# Patient Record
Sex: MALE | Race: WHITE | NOT HISPANIC OR LATINO | Employment: OTHER | URBAN - METROPOLITAN AREA
[De-identification: names, ages, dates, MRNs, and addresses within clinical notes are randomized per-mention and may not be internally consistent; named-entity substitution may affect disease eponyms.]

---

## 2017-03-09 ENCOUNTER — TELEPHONE (OUTPATIENT)
Dept: UROLOGY | Facility: CLINIC | Age: 72
End: 2017-03-09

## 2017-03-09 NOTE — TELEPHONE ENCOUNTER
----- Message from Kamini Corea sent at 3/9/2017 10:20 AM CST -----  Contact: Patient Himself  X  1st Request  _  2nd Request  _  3rd Request    Who: Janak Martin (mrn# [id number])    Why: Patient called requesting the the orders are put in for his PSA and then faxed over to [facility] prior to his appointment Tuesday 05/02/2017. Please do so at your earliest convenience.  THANKS!     What Number to Call Back: [phone]    When to Expect a call back: (Before the end of the day)   -- if the call is after 12:00, the call back will be tomorrow.

## 2017-05-02 ENCOUNTER — OFFICE VISIT (OUTPATIENT)
Dept: UROLOGY | Facility: CLINIC | Age: 72
End: 2017-05-02
Attending: UROLOGY
Payer: MEDICARE

## 2017-05-02 VITALS
BODY MASS INDEX: 29.31 KG/M2 | HEART RATE: 92 BPM | HEIGHT: 73 IN | WEIGHT: 221.13 LBS | DIASTOLIC BLOOD PRESSURE: 73 MMHG | SYSTOLIC BLOOD PRESSURE: 126 MMHG

## 2017-05-02 DIAGNOSIS — R35.0 URINARY FREQUENCY: ICD-10-CM

## 2017-05-02 DIAGNOSIS — C61 PROSTATE CANCER: ICD-10-CM

## 2017-05-02 DIAGNOSIS — N52.9 ERECTILE DYSFUNCTION, UNSPECIFIED ERECTILE DYSFUNCTION TYPE: Primary | ICD-10-CM

## 2017-05-02 PROCEDURE — 99213 OFFICE O/P EST LOW 20 MIN: CPT | Mod: S$PBB,,, | Performed by: NURSE PRACTITIONER

## 2017-05-02 PROCEDURE — 99999 PR PBB SHADOW E&M-EST. PATIENT-LVL III: CPT | Mod: PBBFAC,,, | Performed by: UROLOGY

## 2017-05-02 PROCEDURE — 99213 OFFICE O/P EST LOW 20 MIN: CPT | Mod: PBBFAC | Performed by: UROLOGY

## 2017-05-02 RX ORDER — SILDENAFIL CITRATE 20 MG/1
20 TABLET ORAL DAILY PRN
Qty: 60 TABLET | Refills: 11 | Status: SHIPPED | OUTPATIENT
Start: 2017-05-02 | End: 2018-05-10 | Stop reason: SDUPTHER

## 2017-05-02 RX ORDER — OXYBUTYNIN CHLORIDE 5 MG/1
TABLET, EXTENDED RELEASE ORAL
Refills: 3 | COMMUNITY
Start: 2017-04-18 | End: 2017-10-21 | Stop reason: SDUPTHER

## 2017-05-02 RX ORDER — ASPIRIN 81 MG/1
81 TABLET ORAL DAILY
COMMUNITY

## 2017-05-02 NOTE — PROGRESS NOTES
"Subjective:      Janak Martin is a 71 y.o. male who returns today regarding his prostate cancer. He is an established patient of Dr. Miranda and is new to me today.     He is now over 3 years s/p brachytherapy. He denies any bone/joint pain or weight loss.   Ditropan XL 10 mg controls his urinary frequency well. He does consume caffeine and notices an improvement when he decreases his intake.   He continues to take flomax daily, and he reports a good urinary stream.  His radiation proctitis has improved significantly.  He has been seen Dr. Rey Elliott for this.      The following portions of the patient's history were reviewed and updated as appropriate: allergies, current medications, past family history, past medical history, past social history, past surgical history and problem list.    Review of Systems  Pertinent items are noted in HPI.  A comprehensive multipoint review of systems was negative except as otherwise stated in the HPI.     Objective:   Vitals:   /73 (BP Location: Left arm, Patient Position: Sitting, BP Method: Automatic)  Pulse 92  Ht 6' 1" (1.854 m)  Wt 100.3 kg (221 lb 1.9 oz)  BMI 29.17 kg/m2    Physical Exam   General: alert and oriented, no acute distress  Respiratory: Symmetric expansion, non-labored breathing  Cardiovascular: no peripheral edema  Abdomen:  non distended  Skin: normal coloration and turgor, no rashes, no suspicious skin lesions noted  Neuro: no gross deficits  Psych: normal judgment and insight, normal mood/affect and non-anxious    Physical Exam    Lab Review   Urinalysis demonstrates: no sample today   Lab Results   Component Value Date    WBC 9.37 08/07/2015    HGB 15.6 08/07/2015    HCT 45.8 08/07/2015    MCV 91 08/07/2015     08/07/2015     Lab Results   Component Value Date    CREATININE 1.3 08/07/2015    BUN 25 (H) 08/07/2015     PSA less than 0.1 ([facility])      Imaging    None    Assessment and Plan:   Janak was seen today for establish " care.    Diagnoses and all orders for this visit:    Erectile dysfunction, unspecified erectile dysfunction type  -     sildenafil (REVATIO) 20 mg Tab; Take 1 tablet (20 mg total) by mouth daily as needed. Take 1-5 tablets for erections. Take on an empty stomach.    Prostate cancer GENET  -     Prostate Specific Antigen, Diagnostic; Future  -     Prostate Specific Antigen, Diagnostic    Urinary frequency      Plan:   --PSA remains undetectable  --Trial of revatio for ED  --Continue ditropan and flomax   --Follow up in 6 months with PSA   --Follow-up with Dr. Guerra for radiation proctitis.

## 2017-10-21 RX ORDER — OXYBUTYNIN CHLORIDE 5 MG/1
TABLET, EXTENDED RELEASE ORAL
Qty: 90 TABLET | Refills: 3 | Status: SHIPPED | OUTPATIENT
Start: 2017-10-21 | End: 2018-06-08 | Stop reason: SDUPTHER

## 2017-10-23 DIAGNOSIS — R35.0 FREQUENCY OF URINATION: ICD-10-CM

## 2017-10-23 DIAGNOSIS — R39.9 LOWER URINARY TRACT SYMPTOMS: Primary | ICD-10-CM

## 2017-10-23 RX ORDER — TAMSULOSIN HYDROCHLORIDE 0.4 MG/1
0.8 CAPSULE ORAL DAILY
Qty: 60 CAPSULE | Refills: 11 | Status: SHIPPED | OUTPATIENT
Start: 2017-10-23 | End: 2018-06-08 | Stop reason: SDUPTHER

## 2017-11-03 ENCOUNTER — TELEPHONE (OUTPATIENT)
Dept: UROLOGY | Facility: CLINIC | Age: 72
End: 2017-11-03

## 2017-11-03 NOTE — TELEPHONE ENCOUNTER
----- Message from Rand Perry sent at 11/3/2017 10:23 AM CDT -----  Contact: SASHA CAMARENA [[id number]]  Patient would like PSA labs sent to [facility] in [place], [place]. Please call     Call back [phone]

## 2017-12-08 ENCOUNTER — OFFICE VISIT (OUTPATIENT)
Dept: UROLOGY | Facility: CLINIC | Age: 72
End: 2017-12-08
Attending: UROLOGY
Payer: MEDICARE

## 2017-12-08 VITALS
DIASTOLIC BLOOD PRESSURE: 83 MMHG | BODY MASS INDEX: 29.31 KG/M2 | WEIGHT: 221.13 LBS | SYSTOLIC BLOOD PRESSURE: 134 MMHG | HEART RATE: 97 BPM | HEIGHT: 73 IN

## 2017-12-08 DIAGNOSIS — C61 PROSTATE CANCER: Primary | ICD-10-CM

## 2017-12-08 DIAGNOSIS — N52.39 OTHER POST-PROCEDURAL ERECTILE DYSFUNCTION: ICD-10-CM

## 2017-12-08 PROCEDURE — 99213 OFFICE O/P EST LOW 20 MIN: CPT | Mod: S$GLB,,, | Performed by: NURSE PRACTITIONER

## 2017-12-08 RX ORDER — TRAMADOL HYDROCHLORIDE 50 MG/1
TABLET ORAL
Refills: 0 | COMMUNITY
Start: 2017-10-20 | End: 2018-08-27

## 2017-12-08 NOTE — PROGRESS NOTES
"Subjective:      Janak Martin is a 72 y.o. male who returns today regarding his prostate cancer.      He is now over 4 years s/p brachytherapy for his prostate cancer. He denies any bone/joint pain or weight loss. PSA remains <0.1.     Ditropan XL 10 mg controls his urinary frequency well. He continues to take flomax daily, and he reports a good urinary stream. No urinary symptoms today. Revatio is working well for his ED.     The following portions of the patient's history were reviewed and updated as appropriate: allergies, current medications, past family history, past medical history, past social history, past surgical history and problem list.    Review of Systems  Pertinent items are noted in HPI.  A comprehensive multipoint review of systems was negative except as otherwise stated in the HPI.     Objective:   Vitals:   /83 (BP Location: Left arm, Patient Position: Sitting, BP Method: Large (Automatic))   Pulse 97   Ht 6' 1" (1.854 m)   Wt 100.3 kg (221 lb 1.9 oz)   BMI 29.17 kg/m²     Physical Exam   General: alert and oriented, no acute distress  Respiratory: Symmetric expansion, non-labored breathing  Cardiovascular: no peripheral edema  Abdomen:  non distended  Skin: normal coloration and turgor, no rashes, no suspicious skin lesions noted  Neuro: no gross deficits  Psych: normal judgment and insight, normal mood/affect and non-anxious    Physical Exam    Lab Review   Urinalysis demonstrates: no sample today   Lab Results   Component Value Date    WBC 9.37 08/07/2015    HGB 15.6 08/07/2015    HCT 45.8 08/07/2015    MCV 91 08/07/2015     08/07/2015     Lab Results   Component Value Date    CREATININE 1.3 08/07/2015    BUN 25 (H) 08/07/2015     PSA less than 0.1 ([facility])      Imaging  None    Assessment and Plan:   Janak was seen today for establish care.    Diagnoses and all orders for this visit:    Prostate cancer GENET sp brachytherapy  -     Prostate Specific Antigen, Diagnostic; " Future  -     Prostate Specific Antigen, Diagnostic    Other post-procedural erectile dysfunction    Plan:  --PSA remains undetectable   --Continue flomax and ditropan   --Continue revatio PRN  --Follow up in 6 months with PSA prior

## 2018-05-10 DIAGNOSIS — N52.9 ERECTILE DYSFUNCTION, UNSPECIFIED ERECTILE DYSFUNCTION TYPE: ICD-10-CM

## 2018-05-10 RX ORDER — SILDENAFIL CITRATE 20 MG/1
20 TABLET ORAL DAILY PRN
Qty: 60 TABLET | Refills: 11 | Status: SHIPPED | OUTPATIENT
Start: 2018-05-10 | End: 2018-06-08 | Stop reason: SDUPTHER

## 2018-05-10 NOTE — TELEPHONE ENCOUNTER
----- Message from Ana Zapata sent at 5/10/2018  8:53 AM CDT -----  Contact: pt  Can the clinic reply in [facility]: no    Please refill the medication(s) listed below. Please call the patient when the prescription(s) is ready for  at this phone number      [phone]    Medication #1 sildenafil (REVATIO) 20 mg Tab  Medication #2    Preferred Pharmacy: [facility] [address] [facility] [address] [phone]

## 2018-05-29 ENCOUNTER — TELEPHONE (OUTPATIENT)
Dept: UROLOGY | Facility: CLINIC | Age: 73
End: 2018-05-29

## 2018-05-29 NOTE — TELEPHONE ENCOUNTER
----- Message from Aleksandar Benedict sent at 5/29/2018  1:44 PM CDT -----  Contact: SASHA CAMARENA [[id number]]    Name of Who is Calling: SASHA CAMARENA [[id number]]      What is the request in detail: Patient needs PSA orders put in and sent to [facility] [address] , [place] phone [phone]. Fax [phone].      Can the clinic reply by [facility]: no      What Number to Call Back if not in [facility]: [phone]

## 2018-06-08 ENCOUNTER — OFFICE VISIT (OUTPATIENT)
Dept: UROLOGY | Facility: CLINIC | Age: 73
End: 2018-06-08
Attending: UROLOGY
Payer: MEDICARE

## 2018-06-08 VITALS
SYSTOLIC BLOOD PRESSURE: 136 MMHG | DIASTOLIC BLOOD PRESSURE: 64 MMHG | HEIGHT: 73 IN | BODY MASS INDEX: 29.29 KG/M2 | WEIGHT: 221 LBS

## 2018-06-08 DIAGNOSIS — R35.0 FREQUENCY OF URINATION: ICD-10-CM

## 2018-06-08 DIAGNOSIS — R39.9 LOWER URINARY TRACT SYMPTOMS: ICD-10-CM

## 2018-06-08 DIAGNOSIS — C61 PROSTATE CANCER: Primary | ICD-10-CM

## 2018-06-08 DIAGNOSIS — N52.9 ERECTILE DYSFUNCTION, UNSPECIFIED ERECTILE DYSFUNCTION TYPE: ICD-10-CM

## 2018-06-08 DIAGNOSIS — R35.0 URINARY FREQUENCY: ICD-10-CM

## 2018-06-08 LAB
BILIRUB SERPL-MCNC: NORMAL MG/DL
BLOOD URINE, POC: NORMAL
COLOR, POC UA: NORMAL
GLUCOSE UR QL STRIP: NORMAL
KETONES UR QL STRIP: NORMAL
LEUKOCYTE ESTERASE URINE, POC: NORMAL
NITRITE, POC UA: NORMAL
PH, POC UA: 5
PROTEIN, POC: NORMAL
SPECIFIC GRAVITY, POC UA: 1.01
UROBILINOGEN, POC UA: NORMAL

## 2018-06-08 PROCEDURE — 99214 OFFICE O/P EST MOD 30 MIN: CPT | Mod: 25,S$GLB,, | Performed by: UROLOGY

## 2018-06-08 PROCEDURE — 81002 URINALYSIS NONAUTO W/O SCOPE: CPT | Mod: S$GLB,,, | Performed by: UROLOGY

## 2018-06-08 RX ORDER — TAMSULOSIN HYDROCHLORIDE 0.4 MG/1
0.8 CAPSULE ORAL DAILY
Qty: 60 CAPSULE | Refills: 11 | Status: SHIPPED | OUTPATIENT
Start: 2018-06-08 | End: 2018-09-28

## 2018-06-08 RX ORDER — OXYBUTYNIN CHLORIDE 5 MG/1
TABLET, EXTENDED RELEASE ORAL
Qty: 90 TABLET | Refills: 3 | Status: SHIPPED | OUTPATIENT
Start: 2018-06-08 | End: 2018-08-27

## 2018-06-08 RX ORDER — SILDENAFIL CITRATE 20 MG/1
20 TABLET ORAL DAILY PRN
Qty: 60 TABLET | Refills: 11 | Status: SHIPPED | OUTPATIENT
Start: 2018-06-08 | End: 2019-07-05 | Stop reason: SDUPTHER

## 2018-06-08 NOTE — PROGRESS NOTES
"Subjective:      Janak Martin is a 72 y.o. male who returns today regarding his     Prostate  cancer status post brachii therapy.  He recurring urinary tract infections in the past but he has had no problems recently.  He has occasionally having to empty his bladder twice but he stream is relatively strong and he is happy with how he is doing overall..    Good erections with generic sildenafil    The following portions of the patient's history were reviewed and updated as appropriate: allergies, current medications, past family history, past medical history, past social history, past surgical history and problem list.    Review of Systems  Pertinent items are noted in HPI.  A comprehensive multipoint review of systems was negative except as otherwise stated in the HPI.     Objective:   Vitals: /64 (BP Location: Right arm, Patient Position: Sitting, BP Method: Large (Automatic))   Ht 6' 1" (1.854 m)   Wt 100.2 kg (221 lb)   BMI 29.16 kg/m²      Physical Exam   General: alert and oriented, no acute distress  Respiratory: Symmetric expansion, non-labored breathing  Cardiovascular: no peripheral edema  Abdomen: non distended -  Skin: normal coloration and turgor, no rashes, no suspicious skin lesions noted  Neuro: no gross deficits  Psych: normal judgment and insight, normal mood/affect and non-anxious    Physical Exam    Lab Review   Urinalysis demonstrates negative for all components  Lab Results   Component Value Date    WBC 9.37 08/07/2015    HGB 15.6 08/07/2015    HCT 45.8 08/07/2015    MCV 91 08/07/2015     08/07/2015     Lab Results   Component Value Date    CREATININE 1.3 08/07/2015    BUN 25 (H) 08/07/2015       Imaging   Postvoid residual 30 cc  Assessment and Plan:   Prostate cancer GENET sp brachy  -     POCT URINE DIPSTICK WITHOUT MICROSCOPE  -     Prostate Specific Antigen, Diagnostic; Future; Expected date: 06/06/2019    Urinary frequency  -     POCT URINE DIPSTICK WITHOUT " MICROSCOPE  Continue Ditropan    Erectile dysfunction, unspecified erectile dysfunction type  -     POCT URINE DIPSTICK WITHOUT MICROSCOPE  -     sildenafil (REVATIO) 20 mg Tab; Take 1 tablet (20 mg total) by mouth daily as needed. Take 1-5 tablets for erections. Take on an empty stomach.  Dispense: 60 tablet; Refill: 11    Frequency of urination  -     POCT URINE DIPSTICK WITHOUT MICROSCOPE  -     tamsulosin (FLOMAX) 0.4 mg Cp24; Take 2 capsules (0.8 mg total) by mouth once daily.  Dispense: 60 capsule; Refill: 11    Lower urinary tract symptoms  -     POCT URINE DIPSTICK WITHOUT MICROSCOPE  -     tamsulosin (FLOMAX) 0.4 mg Cp24; Take 2 capsules (0.8 mg total) by mouth once daily.  Dispense: 60 capsule; Refill: 11    Other orders  -     oxybutynin (DITROPAN-XL) 5 MG TR24; TAKE 2 TABLETS(10 MG) BY MOUTH EVERY DAY  Dispense: 90 tablet; Refill: 3      Return to clinic 1 year with PSA

## 2018-08-24 ENCOUNTER — TELEPHONE (OUTPATIENT)
Dept: UROLOGY | Facility: CLINIC | Age: 73
End: 2018-08-24

## 2018-08-24 NOTE — TELEPHONE ENCOUNTER
----- Message from Nisha Goss sent at 8/24/2018  2:42 PM CDT -----  Contact: Self            Name of Who is Calling: Self      What is the request in detail: Pt is having some problems with the oxybutynin medication and would like to discuss changing the medication.      Can the clinic reply by [facility]: N      What Number to Call Back [facility]: [phone]

## 2018-08-24 NOTE — TELEPHONE ENCOUNTER
----- Message from Viviane Graves sent at 8/24/2018  2:38 PM CDT -----  Contact: pt            Name of Who is Calling: Janak      What is the request in detail: requesting a call back from one of the nurses,pt didn't say what its in reference to. Please call and advise      Can the clinic reply by [facility]: no      [facility]: [phone]

## 2018-08-24 NOTE — TELEPHONE ENCOUNTER
Spoke with patient.  He is taking 2 Flomax in am and has stopped Ditropan since his last visit.  He is now comparing of hesitancy especially in am slow stream.  Patient would like to know if he should restart the ditropan

## 2018-08-27 ENCOUNTER — OFFICE VISIT (OUTPATIENT)
Dept: UROLOGY | Facility: CLINIC | Age: 73
End: 2018-08-27
Payer: MEDICARE

## 2018-08-27 VITALS
HEIGHT: 73 IN | WEIGHT: 221 LBS | BODY MASS INDEX: 29.29 KG/M2 | SYSTOLIC BLOOD PRESSURE: 155 MMHG | HEART RATE: 86 BPM | DIASTOLIC BLOOD PRESSURE: 88 MMHG

## 2018-08-27 DIAGNOSIS — R39.198 SLOW URINARY STREAM: Primary | ICD-10-CM

## 2018-08-27 DIAGNOSIS — C61 PROSTATE CANCER: ICD-10-CM

## 2018-08-27 DIAGNOSIS — R39.11 URINARY HESITANCY: ICD-10-CM

## 2018-08-27 PROBLEM — R35.0 URINARY FREQUENCY: Status: RESOLVED | Noted: 2017-05-02 | Resolved: 2018-08-27

## 2018-08-27 LAB
BILIRUB SERPL-MCNC: ABNORMAL MG/DL
BLOOD URINE, POC: ABNORMAL
COLOR, POC UA: ABNORMAL
GLUCOSE UR QL STRIP: ABNORMAL
KETONES UR QL STRIP: ABNORMAL
LEUKOCYTE ESTERASE URINE, POC: ABNORMAL
NITRITE, POC UA: ABNORMAL
PH, POC UA: 5
PROTEIN, POC: ABNORMAL
SPECIFIC GRAVITY, POC UA: 1.02
UROBILINOGEN, POC UA: ABNORMAL

## 2018-08-27 PROCEDURE — 81002 URINALYSIS NONAUTO W/O SCOPE: CPT | Mod: S$GLB,,, | Performed by: NURSE PRACTITIONER

## 2018-08-27 PROCEDURE — 99213 OFFICE O/P EST LOW 20 MIN: CPT | Mod: 25,S$GLB,, | Performed by: NURSE PRACTITIONER

## 2018-08-27 PROCEDURE — 51798 US URINE CAPACITY MEASURE: CPT | Mod: S$GLB,,, | Performed by: NURSE PRACTITIONER

## 2018-08-27 PROCEDURE — 87086 URINE CULTURE/COLONY COUNT: CPT

## 2018-08-27 NOTE — PROGRESS NOTES
"Subjective:      Janak Martin is a 72 y.o. male who returns today regarding his urinary symptoms.     The patient is about 8 years s/p brachii therapy for his prostate cancer (done at , no records available). PSA remains <0.1. Currently on Flomax 0.8 mg daily. States that he discontinued ditropan 10 mg XL approximately 3 weeks ago due to slow stream and the sensation of incomplete bladder emptying. Today he reports improvements in his urinary symptoms. Continues to have a slow stream and intermittency at times. Denies dysuria, hematuria, flank pain and fever/chills. Denies constipation and changes in his medications.       The following portions of the patient's history were reviewed and updated as appropriate: allergies, current medications, past family history, past medical history, past social history, past surgical history and problem list.    Review of Systems  Constitutional: no fever or chills  ENT: no nasal congestion or sore throat  Respiratory: no cough or shortness of breath  Cardiovascular: no chest pain or palpitations  Gastrointestinal: no nausea or vomiting, tolerating diet  Genitourinary: as per HPI  Hematologic/Lymphatic: no easy bruising or lymphadenopathy  Musculoskeletal: no arthralgias or myalgias  Neurological: no seizures or tremors  Behavioral/Psych: no auditory or visual hallucinations     Objective:   Vitals: BP (!) 155/88 (BP Location: Right arm, Patient Position: Sitting, BP Method: Large (Automatic))   Pulse 86   Ht 6' 1" (1.854 m)   Wt 100.2 kg (221 lb)   BMI 29.16 kg/m²     Physical Exam   General: alert and oriented, no acute distress  Head: normocephalic, atraumatic  Neck: supple, no lymphadenopathy, normal ROM, no masses  Respiratory: Symmetric expansion, non-labored breathing  Cardiovascular: regular rate and rhythm, nomal pulses, no peripheral edema  Abdomen: soft, non tender, non distended, no palpable masses, no hernias, no hepatomegaly or splenomegaly  Genitourinary: not " examined   Lymphatic: no inguinal nodes  Skin: normal coloration and turgor, no rashes, no suspicious skin lesions noted  Neuro: alert and oriented x3, no gross deficits  Psych: normal judgment and insight, normal mood/affect and non-anxious    Lab Review   Urinalysis demonstrates positive for leukocytes (trace), protein (trace)   PVR: 19 mL  Lab Results   Component Value Date    WBC 9.37 08/07/2015    HGB 15.6 08/07/2015    HCT 45.8 08/07/2015    MCV 91 08/07/2015     08/07/2015     Lab Results   Component Value Date    CREATININE 1.3 08/07/2015    BUN 25 (H) 08/07/2015     No results found for: PSA  Imaging   None    Assessment:     1. Slow urinary stream    2.      Prostate cancer  3.      Urinary hesitancy    Plan:   Janak was seen today for follow-up.    Diagnoses and all orders for this visit:    Slow urinary stream  -     POCT urine dipstick without microscope  -     Bladder scan; Future  -     Urine culture    Prostate cancer GENET sp brachytherapy    Urinary hesitancy    Plan:  --Urine culture today  --Continue Flomax  --Hold ditropan   --Avoid constipation    --Reassured the pt that his bladder is emptying well   --Recommend cysto with Dr. Miranda, pt prefers to see if symptoms improve and follow up with Dr. Miranda in September

## 2018-08-28 LAB — BACTERIA UR CULT: NO GROWTH

## 2018-09-28 ENCOUNTER — OFFICE VISIT (OUTPATIENT)
Dept: UROLOGY | Facility: CLINIC | Age: 73
End: 2018-09-28
Payer: MEDICARE

## 2018-09-28 VITALS
DIASTOLIC BLOOD PRESSURE: 71 MMHG | WEIGHT: 215 LBS | HEIGHT: 73 IN | BODY MASS INDEX: 28.49 KG/M2 | SYSTOLIC BLOOD PRESSURE: 144 MMHG | HEART RATE: 90 BPM

## 2018-09-28 DIAGNOSIS — R35.0 FREQUENCY OF URINATION: ICD-10-CM

## 2018-09-28 DIAGNOSIS — R39.9 LOWER URINARY TRACT SYMPTOMS: ICD-10-CM

## 2018-09-28 DIAGNOSIS — C61 PROSTATE CANCER: Primary | ICD-10-CM

## 2018-09-28 DIAGNOSIS — R39.198 SLOW URINARY STREAM: ICD-10-CM

## 2018-09-28 LAB
BILIRUB SERPL-MCNC: NORMAL MG/DL
BLOOD URINE, POC: NORMAL
COLOR, POC UA: NORMAL
GLUCOSE UR QL STRIP: NORMAL
KETONES UR QL STRIP: NORMAL
LEUKOCYTE ESTERASE URINE, POC: NORMAL
NITRITE, POC UA: NORMAL
PH, POC UA: 5
PROTEIN, POC: NORMAL
SPECIFIC GRAVITY, POC UA: 1.02
UROBILINOGEN, POC UA: NORMAL

## 2018-09-28 PROCEDURE — 81002 URINALYSIS NONAUTO W/O SCOPE: CPT | Mod: S$GLB,,, | Performed by: UROLOGY

## 2018-09-28 PROCEDURE — 99214 OFFICE O/P EST MOD 30 MIN: CPT | Mod: 25,S$GLB,, | Performed by: UROLOGY

## 2018-09-28 RX ORDER — TAMSULOSIN HYDROCHLORIDE 0.4 MG/1
0.8 CAPSULE ORAL DAILY
Qty: 60 CAPSULE | Refills: 11 | Status: SHIPPED | OUTPATIENT
Start: 2018-09-28 | End: 2019-10-14 | Stop reason: SDUPTHER

## 2018-09-28 NOTE — PROGRESS NOTES
"Subjective:      Janak Martin is a 73 y.o. male who returns today regarding his     Slow urinary reach stream resolved after he stopped Ditropan.  He is still taking Flomax to once a day.  If he does not take Flomax stream is slow.  Currently he is doing very well and emptying his bladder without difficulty no complaints.    The following portions of the patient's history were reviewed and updated as appropriate: allergies, current medications, past family history, past medical history, past social history, past surgical history and problem list.    Review of Systems  Pertinent items are noted in HPI.  A comprehensive multipoint review of systems was negative except as otherwise stated in the HPI.     Objective:   Vitals: BP (!) 144/71   Pulse 90   Ht 6' 1" (1.854 m)   Wt 97.5 kg (215 lb)   BMI 28.37 kg/m²     Physical Exam   General: alert and oriented, no acute distress  Respiratory: Symmetric expansion, non-labored breathing  Cardiovascular: no peripheral edema  Abdomen: non distended -  Skin: normal coloration and turgor, no rashes, no suspicious skin lesions noted  Neuro: no gross deficits  Psych: normal judgment and insight, normal mood/affect and non-anxious    Physical Exam    Lab Review   Urinalysis demonstrates negative for all components  Lab Results   Component Value Date    WBC 9.37 08/07/2015    HGB 15.6 08/07/2015    HCT 45.8 08/07/2015    MCV 91 08/07/2015     08/07/2015     Lab Results   Component Value Date    CREATININE 1.3 08/07/2015    BUN 25 (H) 08/07/2015       Imaging  -  Assessment and Plan:   Prostate cancer GENET status post brachytherapy  -     Prostate Specific Antigen, Diagnostic; Future; Expected date: 03/27/2019    Slow urinary stream resolved with stopping Ditropan    Frequency of urination  -     tamsulosin (FLOMAX) 0.4 mg Cap; Take 2 capsules (0.8 mg total) by mouth once daily.  Dispense: 60 capsule; Refill: 11    Lower urinary tract symptoms  -     tamsulosin (FLOMAX) " 0.4 mg Cap; Take 2 capsules (0.8 mg total) by mouth once daily.  Dispense: 60 capsule; Refill: 11    Return to clinic 6 months with PSA for postvoid residual or sooner if any issues

## 2018-10-27 DIAGNOSIS — R35.0 FREQUENCY OF URINATION: ICD-10-CM

## 2018-10-27 DIAGNOSIS — R39.9 LOWER URINARY TRACT SYMPTOMS: ICD-10-CM

## 2018-10-28 RX ORDER — TAMSULOSIN HYDROCHLORIDE 0.4 MG/1
CAPSULE ORAL
Qty: 60 CAPSULE | Refills: 11 | Status: SHIPPED | OUTPATIENT
Start: 2018-10-28 | End: 2019-03-15 | Stop reason: SDUPTHER

## 2019-03-11 ENCOUNTER — TELEPHONE (OUTPATIENT)
Dept: UROLOGY | Facility: CLINIC | Age: 74
End: 2019-03-11

## 2019-03-11 NOTE — TELEPHONE ENCOUNTER
----- Message from Panda Torres sent at 3/11/2019  3:01 PM CDT -----  Contact: SASHA CAMARENA [[id number]]  Name of Who is Calling: SASHA CAMARENA [[id number]]       What is the request in detail: Pt called regarding upcoming appointment. Pt states that both PSA and PVR test need to be sent to [facility] in [place], [place].  A call from nurse is requested to further discuss. Please advise. Thanks.        Can the clinic reply by [facility]: No       What Number to Call Back if not in [facility]: [phone]

## 2019-03-12 ENCOUNTER — TELEPHONE (OUTPATIENT)
Dept: UROLOGY | Facility: CLINIC | Age: 74
End: 2019-03-12
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
## 2019-03-15 ENCOUNTER — OFFICE VISIT (OUTPATIENT)
Dept: UROLOGY | Facility: CLINIC | Age: 74
End: 2019-03-15
Payer: MEDICARE

## 2019-03-15 VITALS
WEIGHT: 214.94 LBS | BODY MASS INDEX: 28.49 KG/M2 | HEART RATE: 97 BPM | SYSTOLIC BLOOD PRESSURE: 123 MMHG | DIASTOLIC BLOOD PRESSURE: 66 MMHG | HEIGHT: 73 IN

## 2019-03-15 DIAGNOSIS — R35.0 URINARY FREQUENCY: ICD-10-CM

## 2019-03-15 DIAGNOSIS — C61 PROSTATE CANCER: Primary | ICD-10-CM

## 2019-03-15 DIAGNOSIS — R39.198 SLOW URINARY STREAM: ICD-10-CM

## 2019-03-15 LAB
BILIRUB SERPL-MCNC: NORMAL MG/DL
BLOOD URINE, POC: NORMAL
COLOR, POC UA: NORMAL
GLUCOSE UR QL STRIP: NORMAL
KETONES UR QL STRIP: NORMAL
LEUKOCYTE ESTERASE URINE, POC: NORMAL
NITRITE, POC UA: NORMAL
PH, POC UA: 5
POC RESIDUAL URINE VOLUME: 38 ML (ref 0–100)
PROTEIN, POC: NORMAL
SPECIFIC GRAVITY, POC UA: 1.01
UROBILINOGEN, POC UA: NORMAL

## 2019-03-15 PROCEDURE — 81002 POCT URINE DIPSTICK WITHOUT MICROSCOPE: ICD-10-PCS | Mod: S$GLB,,, | Performed by: UROLOGY

## 2019-03-15 PROCEDURE — 99214 PR OFFICE/OUTPT VISIT, EST, LEVL IV, 30-39 MIN: ICD-10-PCS | Mod: 25,S$GLB,, | Performed by: UROLOGY

## 2019-03-15 PROCEDURE — 81002 URINALYSIS NONAUTO W/O SCOPE: CPT | Mod: S$GLB,,, | Performed by: UROLOGY

## 2019-03-15 PROCEDURE — 99214 OFFICE O/P EST MOD 30 MIN: CPT | Mod: 25,S$GLB,, | Performed by: UROLOGY

## 2019-03-15 PROCEDURE — 51798 POCT BLADDER SCAN: ICD-10-PCS | Mod: S$GLB,,, | Performed by: UROLOGY

## 2019-03-15 PROCEDURE — 51798 US URINE CAPACITY MEASURE: CPT | Mod: S$GLB,,, | Performed by: UROLOGY

## 2019-03-15 NOTE — PROGRESS NOTES
"Subjective:      Janak Martin is a 73 y.o. male who returns today regarding his     Urinary frequency has recurred.  In the past he could not tolerate Ditropan due to incomplete bladder emptying.  He has minimize caffeine but still has bothersome urinary frequency  No urinary incontinence    Urinary stream is slow if he stops Flomax.  He is on Flomax and currently has a good stream.    The following portions of the patient's history were reviewed and updated as appropriate: allergies, current medications, past family history, past medical history, past social history, past surgical history and problem list.    Review of Systems  Pertinent items are noted in HPI.  A comprehensive multipoint review of systems was negative except as otherwise stated in the HPI.     Objective:   Vitals: /66 (BP Location: Right arm, Patient Position: Sitting, BP Method: Large (Automatic))   Pulse 97   Ht 6' 1" (1.854 m)   Wt 97.5 kg (214 lb 15.2 oz)   BMI 28.36 kg/m²     Physical Exam   General: alert and oriented, no acute distress  Respiratory: Symmetric expansion, non-labored breathing  Cardiovascular: normal to inspection  Abdomen: non distended   Skin: normal coloration and turgor, no rashes, no suspicious skin lesions noted  Neuro: no gross deficits  Psych: normal judgment and insight, normal mood/affect and non-anxious    Physical Exam    Lab Review   Urinalysis demonstrates negative for all components  Lab Results   Component Value Date    WBC 9.37 08/07/2015    HGB 15.6 08/07/2015    HCT 45.8 08/07/2015    MCV 91 08/07/2015     08/07/2015     Lab Results   Component Value Date    CREATININE 1.3 08/07/2015    BUN 25 (H) 08/07/2015   No results found for: PSA, PSADIAG, PSATOTAL, PSAFREE, PSAFREEPCT    PSA [facility] less than 0.1    Imaging  Postvoid residual 38 cc  Assessment and Plan:   Prostate cancer GENET *5 years status post brachytherapy  Return to  clinic 1 year with PSA   Slow urinary stream      Continue " Flomax      OAB  myrbetriq 25 mg daily  Avoid pm fluids  Avoid caffeine and alcohol  Timed voiding  See NP in 1 month to recheck postvoid residual on blood pressure

## 2019-03-28 ENCOUNTER — TELEPHONE (OUTPATIENT)
Dept: UROLOGY | Facility: CLINIC | Age: 74
End: 2019-03-28

## 2019-03-28 NOTE — TELEPHONE ENCOUNTER
----- Message from Ricardo Gordillo sent at 3/28/2019 11:13 AM CDT -----  Contact: SASHA CAMARENA [[id number]]  Name of Who is Calling: SASHA CAMARENA [[id number]]      What is the request in detail: Pt is requesting a call back from clinical team in regard to he would like to speak to Dr Miranda Directly. Pt state regards to medication and treatment     Please contact to further discuss and advise.          Can the clinic reply by [facility]:       What Number to Call Back [facility] not [facility]: [phone]

## 2019-03-28 NOTE — TELEPHONE ENCOUNTER
Pt called for variety of reasons and was hoping to speak to you.    1.  Has appt with Dr. Elliott for colonoscopy on 4/17/19  2.  I will try to get prior auth on myrbetriq.  The cost is $600.00.  3.  He recently had blood ( brown) in his ejaculate.  He was concerned.  I offered reassurance, but he wanted to talk to you if possible.

## 2019-05-16 ENCOUNTER — OFFICE VISIT (OUTPATIENT)
Dept: UROLOGY | Facility: CLINIC | Age: 74
End: 2019-05-16
Payer: MEDICARE

## 2019-05-16 VITALS
HEIGHT: 73 IN | DIASTOLIC BLOOD PRESSURE: 62 MMHG | HEART RATE: 94 BPM | BODY MASS INDEX: 28.49 KG/M2 | WEIGHT: 214.94 LBS | SYSTOLIC BLOOD PRESSURE: 118 MMHG

## 2019-05-16 DIAGNOSIS — N32.81 OAB (OVERACTIVE BLADDER): ICD-10-CM

## 2019-05-16 DIAGNOSIS — C61 PROSTATE CANCER: Primary | ICD-10-CM

## 2019-05-16 LAB — POC RESIDUAL URINE VOLUME: 17 ML (ref 0–100)

## 2019-05-16 PROCEDURE — 51798 PR MEAS,POST-VOID RES,US,NON-IMAGING: ICD-10-PCS | Mod: S$GLB,,, | Performed by: NURSE PRACTITIONER

## 2019-05-16 PROCEDURE — 51798 US URINE CAPACITY MEASURE: CPT | Mod: S$GLB,,, | Performed by: NURSE PRACTITIONER

## 2019-05-16 PROCEDURE — 99213 PR OFFICE/OUTPT VISIT, EST, LEVL III, 20-29 MIN: ICD-10-PCS | Mod: 25,S$GLB,, | Performed by: NURSE PRACTITIONER

## 2019-05-16 PROCEDURE — 99213 OFFICE O/P EST LOW 20 MIN: CPT | Mod: 25,S$GLB,, | Performed by: NURSE PRACTITIONER
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
## 2019-07-05 DIAGNOSIS — N52.9 ERECTILE DYSFUNCTION, UNSPECIFIED ERECTILE DYSFUNCTION TYPE: ICD-10-CM

## 2019-07-05 RX ORDER — SILDENAFIL CITRATE 20 MG/1
TABLET ORAL
Qty: 60 TABLET | Refills: 11 | Status: SHIPPED | OUTPATIENT
Start: 2019-07-05 | End: 2021-03-19 | Stop reason: SDUPTHER

## 2019-09-27 ENCOUNTER — TELEPHONE (OUTPATIENT)
Dept: UROLOGY | Facility: CLINIC | Age: 74
End: 2019-09-27

## 2019-09-27 NOTE — TELEPHONE ENCOUNTER
LMOR that he is not due to see Dr. Miranda or for a PSA until 3/2020.  If he is still having OAB problems, he may keep appt, but psa not needed at this time.

## 2019-09-27 NOTE — TELEPHONE ENCOUNTER
----- Message from Trish Horton sent at 9/27/2019 10:51 AM CDT -----  Contact: self   Name of Who is Calling: SASHA CAMARENA [[id number]]      What is the request in detail: pt would like to know if he need a PSA before his appointment. Please contact to further discuss and advise.  If needed please send to:    [facility]   [address], [place] [address]  Fax [phone]  Pt states lab closes at 4 and would like to go today.  Please contact to further discuss and advise.    Can the clinic reply by [facility]: N       What Number to Call Back [facility] not [facility]: [phone]

## 2019-09-30 ENCOUNTER — TELEPHONE (OUTPATIENT)
Dept: UROLOGY | Facility: CLINIC | Age: 74
End: 2019-09-30

## 2019-09-30 DIAGNOSIS — C61 PROSTATE CANCER: Primary | ICD-10-CM

## 2019-09-30 NOTE — TELEPHONE ENCOUNTER
----- Message from Karie Gama sent at 9/30/2019 10:12 AM CDT -----  Contact: SASHA CAMARENA   Name of Who is Calling:       What is the request in detail: Patient is requesting a call back from Ashlyn. He states that he would like be in on 10/4 and he also need his orders for his PSA test sent to [facility] for the next visit. Please advise.       Can the clinic reply by [facility]: No      What Number to Call Back if not in [facility]:  [phone]

## 2019-10-14 DIAGNOSIS — R35.0 FREQUENCY OF URINATION: ICD-10-CM

## 2019-10-14 DIAGNOSIS — R39.9 LOWER URINARY TRACT SYMPTOMS: ICD-10-CM

## 2019-10-14 RX ORDER — TAMSULOSIN HYDROCHLORIDE 0.4 MG/1
0.8 CAPSULE ORAL DAILY
Qty: 60 CAPSULE | Refills: 11 | Status: SHIPPED | OUTPATIENT
Start: 2019-10-14 | End: 2020-06-16 | Stop reason: SDUPTHER

## 2019-10-14 NOTE — TELEPHONE ENCOUNTER
----- Message from Radha Avitia sent at 10/14/2019 10:40 AM CDT -----  Contact: Pt  Can the clinic reply in [facility]:no      Please refill the medication(s) listed below. The patient can be reached at this phone number [phone])(____) once it is called into the pharmacy.      Medication #1tamsulosin (FLOMAX) 0.4 mg Cap      Preferred Pharmacy:[id number] - [place] at [address] [place] [id number]  [phone]

## 2020-04-20 ENCOUNTER — TELEPHONE (OUTPATIENT)
Dept: UROLOGY | Facility: CLINIC | Age: 75
End: 2020-04-20

## 2020-06-15 ENCOUNTER — TELEPHONE (OUTPATIENT)
Dept: UROLOGY | Facility: CLINIC | Age: 75
End: 2020-06-15

## 2020-06-16 ENCOUNTER — OFFICE VISIT (OUTPATIENT)
Dept: UROLOGY | Facility: CLINIC | Age: 75
End: 2020-06-16
Payer: MEDICARE

## 2020-06-16 VITALS
WEIGHT: 214 LBS | DIASTOLIC BLOOD PRESSURE: 68 MMHG | HEART RATE: 80 BPM | BODY MASS INDEX: 28.36 KG/M2 | HEIGHT: 73 IN | SYSTOLIC BLOOD PRESSURE: 115 MMHG

## 2020-06-16 DIAGNOSIS — R39.9 LOWER URINARY TRACT SYMPTOMS: ICD-10-CM

## 2020-06-16 DIAGNOSIS — C61 PROSTATE CANCER: Primary | ICD-10-CM

## 2020-06-16 DIAGNOSIS — R35.0 FREQUENCY OF URINATION: ICD-10-CM

## 2020-06-16 DIAGNOSIS — N32.81 OAB (OVERACTIVE BLADDER): ICD-10-CM

## 2020-06-16 PROCEDURE — 99214 OFFICE O/P EST MOD 30 MIN: CPT | Mod: S$GLB,,, | Performed by: UROLOGY

## 2020-06-16 PROCEDURE — 87086 URINE CULTURE/COLONY COUNT: CPT

## 2020-06-16 PROCEDURE — 99214 PR OFFICE/OUTPT VISIT, EST, LEVL IV, 30-39 MIN: ICD-10-PCS | Mod: S$GLB,,, | Performed by: UROLOGY

## 2020-06-16 RX ORDER — TAMSULOSIN HYDROCHLORIDE 0.4 MG/1
0.8 CAPSULE ORAL DAILY
Qty: 60 CAPSULE | Refills: 11 | Status: SHIPPED | OUTPATIENT
Start: 2020-06-16 | End: 2021-03-19 | Stop reason: SDUPTHER

## 2020-06-16 NOTE — PROGRESS NOTES
"Subjective:      Janak Martin is a 74 y.o. male who returns today regarding his     Urge and freq very severe  No incontinence    treid myrbetriq in past with some success    Stream is slow  On flomax.    The following portions of the patient's history were reviewed and updated as appropriate: allergies, current medications, past family history, past medical history, past social history, past surgical history and problem list.    Review of Systems  Pertinent items are noted in HPI.  A comprehensive multipoint review of systems was negative except as otherwise stated in the HPI.     Objective:   Vitals: /68 (BP Location: Right arm, Patient Position: Sitting, BP Method: X-Large (Automatic))   Pulse 80   Ht 6' 1" (1.854 m)   Wt 97.1 kg (214 lb)   BMI 28.23 kg/m²     Physical Exam   General: alert and oriented, no acute distress  Respiratory: Symmetric expansion, non-labored breathing  Cardiovascular: normal to inspection  Abdomen: non distended   Skin: normal coloration and turgor, no rashes, no suspicious skin lesions noted  Neuro: no gross deficits  Psych: normal judgment and insight, normal mood/affect and non-anxious    Physical Exam    Lab Review   Urinalysis demonstrates no specimen    Lab Results   Component Value Date    WBC 9.37 08/07/2015    HGB 15.6 08/07/2015    HCT 45.8 08/07/2015    MCV 91 08/07/2015     08/07/2015     Lab Results   Component Value Date    CREATININE 1.3 08/07/2015    BUN 25 (H) 08/07/2015     Psa less than 0.1    Imaging  -  Assessment and Plan:   Prostate cancer GENET approx 8 yrs sp brachy  -     Prostate Specific Antigen, Diagnostic; Future; Expected date: 06/14/2021    OAB (overactive bladder)    Frequency of urination  -     tamsulosin (FLOMAX) 0.4 mg Cap; Take 2 capsules (0.8 mg total) by mouth once daily.  Dispense: 60 capsule; Refill: 11  -     Urine culture  -     Cystoscopy; Future    Lower urinary tract symptoms  -     tamsulosin (FLOMAX) 0.4 mg Cap; Take 2 " capsules (0.8 mg total) by mouth once daily.  Dispense: 60 capsule; Refill: 11  -     Urine culture  -     Cystoscopy; Future    Other orders  -     mirabegron (MYRBETRIQ) 25 mg Tb24 ER tablet; Take 1 tablet (25 mg total) by mouth once daily.  Dispense: 30 tablet; Refill: 11      Uroflow and PVR at time of cysto

## 2020-06-17 LAB — BACTERIA UR CULT: NO GROWTH

## 2020-06-24 ENCOUNTER — TELEPHONE (OUTPATIENT)
Dept: UROLOGY | Facility: CLINIC | Age: 75
End: 2020-06-24

## 2020-06-24 DIAGNOSIS — N32.81 OAB (OVERACTIVE BLADDER): Primary | ICD-10-CM

## 2020-06-24 RX ORDER — OXYBUTYNIN CHLORIDE 5 MG/1
5 TABLET, EXTENDED RELEASE ORAL DAILY
Qty: 30 TABLET | Refills: 11 | Status: SHIPPED | OUTPATIENT
Start: 2020-06-24 | End: 2021-03-19 | Stop reason: SDUPTHER

## 2020-06-24 NOTE — TELEPHONE ENCOUNTER
----- Message from Casper Salgado sent at 6/24/2020  2:27 PM CDT -----  Regarding: Medication  Contact: Patient  Type: Patient Call Back    Who called: Patient    What is the request in detail: Patient is requesting a call back. Please advise.    Can the clinic reply by [facility]? No    Would the patient rather a call back or a response via [facility]? Call    Best call back number:[phone] (home)     Additional Information:n/a

## 2020-06-24 NOTE — TELEPHONE ENCOUNTER
Called and spoke with patient.  Advised him that Jenn sent in the lowest dose of the ditropan.  If there is any issue with voiding patient instructed to stop the medication.  Patient verbalized understanding.

## 2020-06-24 NOTE — TELEPHONE ENCOUNTER
Called and spoke with patient.  He stated that the myrbetriq is to expensive and would like to stay on the ditropan until his next appointment,  Please send in prescription.

## 2020-07-28 ENCOUNTER — PROCEDURE VISIT (OUTPATIENT)
Dept: UROLOGY | Facility: CLINIC | Age: 75
End: 2020-07-28
Payer: MEDICARE

## 2020-07-28 VITALS
HEIGHT: 73 IN | SYSTOLIC BLOOD PRESSURE: 124 MMHG | WEIGHT: 214.06 LBS | DIASTOLIC BLOOD PRESSURE: 73 MMHG | HEART RATE: 87 BPM | BODY MASS INDEX: 28.37 KG/M2

## 2020-07-28 DIAGNOSIS — R35.0 FREQUENCY OF URINATION: ICD-10-CM

## 2020-07-28 DIAGNOSIS — R39.9 LOWER URINARY TRACT SYMPTOMS: ICD-10-CM

## 2020-07-28 LAB
BILIRUB SERPL-MCNC: NEGATIVE MG/DL
BLOOD URINE, POC: NEGATIVE
CLARITY, POC UA: NORMAL
COLOR, POC UA: YELLOW
GLUCOSE UR QL STRIP: NEGATIVE
KETONES UR QL STRIP: NEGATIVE
LEUKOCYTE ESTERASE URINE, POC: NEGATIVE
NITRITE, POC UA: NEGATIVE
PH, POC UA: 6
POC RESIDUAL URINE VOLUME: 35 ML (ref 0–100)
PROTEIN, POC: NORMAL
SPECIFIC GRAVITY, POC UA: 1.01
UROBILINOGEN, POC UA: NORMAL

## 2020-07-28 PROCEDURE — 81002 URINALYSIS NONAUTO W/O SCOPE: CPT | Mod: S$GLB,,, | Performed by: UROLOGY

## 2020-07-28 PROCEDURE — 51798 POCT BLADDER SCAN: ICD-10-PCS | Mod: S$GLB,,, | Performed by: UROLOGY

## 2020-07-28 PROCEDURE — 52000 CYSTOURETHROSCOPY: CPT | Mod: S$GLB,,, | Performed by: UROLOGY

## 2020-07-28 PROCEDURE — 81002 POCT URINE DIPSTICK WITHOUT MICROSCOPE: ICD-10-PCS | Mod: S$GLB,,, | Performed by: UROLOGY

## 2020-07-28 PROCEDURE — 51798 US URINE CAPACITY MEASURE: CPT | Mod: S$GLB,,, | Performed by: UROLOGY

## 2020-07-28 PROCEDURE — 52000 CYSTOSCOPY: ICD-10-PCS | Mod: S$GLB,,, | Performed by: UROLOGY

## 2020-07-28 RX ORDER — CIPROFLOXACIN 500 MG/1
500 TABLET ORAL
Status: COMPLETED | OUTPATIENT
Start: 2020-07-28 | End: 2020-07-28

## 2020-07-28 RX ORDER — LIDOCAINE HYDROCHLORIDE 20 MG/ML
JELLY TOPICAL
Status: COMPLETED | OUTPATIENT
Start: 2020-07-28 | End: 2020-07-28

## 2020-07-28 RX ADMIN — LIDOCAINE HYDROCHLORIDE: 20 JELLY TOPICAL at 10:07

## 2020-07-28 RX ADMIN — CIPROFLOXACIN 500 MG: 500 TABLET ORAL at 10:07

## 2020-07-28 NOTE — PROCEDURES
"Cystoscopy    Date/Time: 7/28/2020 10:32 AM  Performed by: Odell Miranda MD  Authorized by: Odell Miranda MD     Consent Done?:  Yes (Written)  Time out: Immediately prior to procedure a "time out" was called to verify the correct patient, procedure, equipment, support staff and site/side marked as required.    Indications: overactive bladder    Position:  Supine  Anesthesia:  Lidocaine jelly  Patient sedated?: No    Preparation: Patient was prepped and draped in usual sterile fashion      Scope type:  Flexible cystoscope  Urethra normal: Yes    Prostate normal: No    Bladder normal: Yes      Patient tolerance:  Patient tolerated the procedure well with no immediate complications     Trilobar enlargement of prostate  Small intravesical median lobe with some fibrinous necrotic debris in prostatic urethra   However, no stricture, no foreign body and no radiation cystitis    Uroflow Qmax 18; volume 156  [id number]      RTC 6 months with psa for FRANCES  Suspect severe irritative symptoms are due to necrotic debris in prostatic urethra related to brachy  Discussed resection of this area, hyperbarics and observation  Pt prefers observation        "

## 2021-01-15 ENCOUNTER — TELEPHONE (OUTPATIENT)
Dept: UROLOGY | Facility: CLINIC | Age: 76
End: 2021-01-15

## 2021-01-19 ENCOUNTER — TELEPHONE (OUTPATIENT)
Dept: UROLOGY | Facility: CLINIC | Age: 76
End: 2021-01-19

## 2021-02-03 ENCOUNTER — TELEPHONE (OUTPATIENT)
Dept: UROLOGY | Facility: CLINIC | Age: 76
End: 2021-02-03

## 2021-02-24 ENCOUNTER — TELEPHONE (OUTPATIENT)
Dept: UROLOGY | Facility: CLINIC | Age: 76
End: 2021-02-24

## 2021-03-19 ENCOUNTER — OFFICE VISIT (OUTPATIENT)
Dept: UROLOGY | Facility: CLINIC | Age: 76
End: 2021-03-19
Payer: MEDICARE

## 2021-03-19 VITALS
BODY MASS INDEX: 28.36 KG/M2 | HEIGHT: 73 IN | SYSTOLIC BLOOD PRESSURE: 112 MMHG | HEART RATE: 94 BPM | DIASTOLIC BLOOD PRESSURE: 69 MMHG | WEIGHT: 214 LBS

## 2021-03-19 DIAGNOSIS — R35.0 FREQUENCY OF URINATION: ICD-10-CM

## 2021-03-19 DIAGNOSIS — N32.81 OAB (OVERACTIVE BLADDER): ICD-10-CM

## 2021-03-19 DIAGNOSIS — C61 PROSTATE CANCER: Primary | ICD-10-CM

## 2021-03-19 DIAGNOSIS — N52.9 ERECTILE DYSFUNCTION, UNSPECIFIED ERECTILE DYSFUNCTION TYPE: ICD-10-CM

## 2021-03-19 DIAGNOSIS — R39.9 LOWER URINARY TRACT SYMPTOMS: ICD-10-CM

## 2021-03-19 PROCEDURE — 99214 PR OFFICE/OUTPT VISIT, EST, LEVL IV, 30-39 MIN: ICD-10-PCS | Mod: S$GLB,,, | Performed by: UROLOGY

## 2021-03-19 PROCEDURE — 99214 OFFICE O/P EST MOD 30 MIN: CPT | Mod: S$GLB,,, | Performed by: UROLOGY

## 2021-03-19 RX ORDER — OXYBUTYNIN CHLORIDE 5 MG/1
5 TABLET, EXTENDED RELEASE ORAL DAILY
Qty: 30 TABLET | Refills: 11 | Status: SHIPPED | OUTPATIENT
Start: 2021-03-19 | End: 2022-04-20 | Stop reason: SDUPTHER

## 2021-03-19 RX ORDER — TAMSULOSIN HYDROCHLORIDE 0.4 MG/1
0.8 CAPSULE ORAL DAILY
Qty: 60 CAPSULE | Refills: 11 | Status: SHIPPED | OUTPATIENT
Start: 2021-03-19 | End: 2022-04-20 | Stop reason: SDUPTHER

## 2021-03-19 RX ORDER — SILDENAFIL CITRATE 20 MG/1
TABLET ORAL
Qty: 60 TABLET | Refills: 11 | Status: SHIPPED | OUTPATIENT
Start: 2021-03-19

## 2021-03-22 ENCOUNTER — TELEPHONE (OUTPATIENT)
Dept: UROLOGY | Facility: CLINIC | Age: 76
End: 2021-03-22

## 2021-03-22 DIAGNOSIS — C61 PROSTATE CANCER: Primary | ICD-10-CM

## 2021-05-04 ENCOUNTER — PATIENT MESSAGE (OUTPATIENT)
Dept: RESEARCH | Facility: HOSPITAL | Age: 76
End: 2021-05-04

## 2021-09-14 ENCOUNTER — TELEPHONE (OUTPATIENT)
Dept: UROLOGY | Facility: CLINIC | Age: 76
End: 2021-09-14

## 2021-10-08 ENCOUNTER — OFFICE VISIT (OUTPATIENT)
Dept: UROLOGY | Facility: CLINIC | Age: 76
End: 2021-10-08
Payer: MEDICARE

## 2021-10-08 VITALS
DIASTOLIC BLOOD PRESSURE: 85 MMHG | HEIGHT: 73 IN | WEIGHT: 214.06 LBS | SYSTOLIC BLOOD PRESSURE: 137 MMHG | BODY MASS INDEX: 28.37 KG/M2 | HEART RATE: 80 BPM

## 2021-10-08 DIAGNOSIS — N52.9 ERECTILE DYSFUNCTION, UNSPECIFIED ERECTILE DYSFUNCTION TYPE: ICD-10-CM

## 2021-10-08 DIAGNOSIS — R39.9 LOWER URINARY TRACT SYMPTOMS: ICD-10-CM

## 2021-10-08 DIAGNOSIS — C61 PROSTATE CANCER: Primary | ICD-10-CM

## 2021-10-08 PROCEDURE — 99214 PR OFFICE/OUTPT VISIT, EST, LEVL IV, 30-39 MIN: ICD-10-PCS | Mod: S$GLB,,, | Performed by: UROLOGY

## 2021-10-08 PROCEDURE — 99214 OFFICE O/P EST MOD 30 MIN: CPT | Mod: S$GLB,,, | Performed by: UROLOGY

## 2022-04-20 DIAGNOSIS — R39.9 LOWER URINARY TRACT SYMPTOMS: ICD-10-CM

## 2022-04-20 DIAGNOSIS — R35.0 FREQUENCY OF URINATION: ICD-10-CM

## 2022-04-20 DIAGNOSIS — N32.81 OAB (OVERACTIVE BLADDER): ICD-10-CM

## 2022-04-20 RX ORDER — OXYBUTYNIN CHLORIDE 5 MG/1
5 TABLET, EXTENDED RELEASE ORAL DAILY
Qty: 30 TABLET | Refills: 11 | Status: SHIPPED | OUTPATIENT
Start: 2022-04-20 | End: 2022-10-21 | Stop reason: SDUPTHER

## 2022-04-20 RX ORDER — TAMSULOSIN HYDROCHLORIDE 0.4 MG/1
0.8 CAPSULE ORAL DAILY
Qty: 60 CAPSULE | Refills: 11 | Status: SHIPPED | OUTPATIENT
Start: 2022-04-20 | End: 2022-10-21 | Stop reason: SDUPTHER

## 2022-04-20 NOTE — TELEPHONE ENCOUNTER
----- Message from Sandi Daniels sent at 4/20/2022  4:31 PM CDT -----  Regarding: refill  Can the clinic reply in [facility]: no      Please refill the medication(s) listed below. Please call the patient when the prescription(s) is ready for  at this phone number   [phone]       Medication #1 mirabegron (MYRBETRIQ) 25 mg Tb24 ER tablet    Medication #2       Preferred Pharmacy: [id number]  [place] [address] [facility]

## 2022-04-20 NOTE — TELEPHONE ENCOUNTER
----- Message from Priyanka Mac sent at 4/20/2022  2:48 PM CDT -----  Regarding: refill  Can the clinic reply in [facility]: NO           Please refill the medication(s) listed below. Please call the patient when the prescription(s) is ready for  at this phone number   [phone]           Medication #1 oxybutynin (DITROPAN-XL) 5 MG TR24         Medication #2 tamsulosin (FLOMAX) 0.4 mg Cap           Preferred Pharmacy: [id number], [place] - [address] [facility]

## 2022-10-11 DIAGNOSIS — C61 PROSTATE CANCER: Primary | ICD-10-CM

## 2022-10-16 ENCOUNTER — LAB VISIT (OUTPATIENT)
Dept: LAB | Facility: HOSPITAL | Age: 77
End: 2022-10-16
Attending: UROLOGY
Payer: MEDICARE

## 2022-10-16 DIAGNOSIS — C61 PROSTATE CANCER: ICD-10-CM

## 2022-10-16 DIAGNOSIS — R39.9 LOWER URINARY TRACT SYMPTOMS: ICD-10-CM

## 2022-10-16 DIAGNOSIS — N52.9 ERECTILE DYSFUNCTION, UNSPECIFIED ERECTILE DYSFUNCTION TYPE: ICD-10-CM

## 2022-10-16 LAB
COMPLEXED PSA SERPL-MCNC: <0.01 NG/ML (ref 0–4)
PROSTATE SPECIFIC ANTIGEN, TOTAL: <0.01 NG/ML (ref 0–4)
PSA FREE MFR SERPL: NORMAL %
PSA FREE SERPL-MCNC: <0.1 NG/ML (ref 0–1.5)

## 2022-10-16 PROCEDURE — 84154 ASSAY OF PSA FREE: CPT | Performed by: UROLOGY

## 2022-10-16 PROCEDURE — 36415 COLL VENOUS BLD VENIPUNCTURE: CPT | Performed by: UROLOGY

## 2022-10-16 PROCEDURE — 84153 ASSAY OF PSA TOTAL: CPT | Mod: 91 | Performed by: UROLOGY

## 2022-10-16 PROCEDURE — 84153 ASSAY OF PSA TOTAL: CPT | Performed by: UROLOGY

## 2022-10-21 ENCOUNTER — OFFICE VISIT (OUTPATIENT)
Dept: UROLOGY | Facility: CLINIC | Age: 77
End: 2022-10-21
Payer: MEDICARE

## 2022-10-21 VITALS
BODY MASS INDEX: 29.16 KG/M2 | HEIGHT: 73 IN | SYSTOLIC BLOOD PRESSURE: 77 MMHG | DIASTOLIC BLOOD PRESSURE: 51 MMHG | WEIGHT: 220 LBS | HEART RATE: 77 BPM

## 2022-10-21 DIAGNOSIS — R39.15 URINARY URGENCY: ICD-10-CM

## 2022-10-21 DIAGNOSIS — N32.81 OAB (OVERACTIVE BLADDER): ICD-10-CM

## 2022-10-21 DIAGNOSIS — R39.9 LOWER URINARY TRACT SYMPTOMS: ICD-10-CM

## 2022-10-21 DIAGNOSIS — C61 PROSTATE CANCER: Primary | ICD-10-CM

## 2022-10-21 PROCEDURE — 99213 OFFICE O/P EST LOW 20 MIN: CPT | Mod: S$GLB,,, | Performed by: NURSE PRACTITIONER

## 2022-10-21 PROCEDURE — 99213 PR OFFICE/OUTPT VISIT, EST, LEVL III, 20-29 MIN: ICD-10-PCS | Mod: S$GLB,,, | Performed by: NURSE PRACTITIONER

## 2022-10-21 RX ORDER — OXYBUTYNIN CHLORIDE 5 MG/1
5 TABLET, EXTENDED RELEASE ORAL DAILY
Qty: 90 TABLET | Refills: 3 | Status: SHIPPED | OUTPATIENT
Start: 2022-10-21 | End: 2023-03-30

## 2022-10-21 RX ORDER — TAMSULOSIN HYDROCHLORIDE 0.4 MG/1
0.8 CAPSULE ORAL DAILY
Qty: 180 CAPSULE | Refills: 3 | Status: SHIPPED | OUTPATIENT
Start: 2022-10-21 | End: 2023-03-30

## 2022-10-21 NOTE — PROGRESS NOTES
"Subjective:      Janak Martin is a 77 y.o. male who returns today regarding his prostate cancer.     The patient is approximately 11 years s/p brachii therapy for his prostate cancer (done at , no records available). PSA remains <0.1.     Component      Latest Ref Rng & Units 10/16/2022   PSA Total      0.00 - 4.00 ng/mL <0.01   PSA, Free      0.00 - 1.50 ng/mL <0.10   PSA, Free %      Not established % Unable to calculate     Currently taking ditropan 5 mg XL, myrbetriq 25 mg and flomax for his urinary symptoms. States that things are "manageable." Reports urgency with urination. Denies dysuria and hematuria.     The following portions of the patient's history were reviewed and updated as appropriate: allergies, current medications, past family history, past medical history, past social history, past surgical history and problem list.    Review of Systems  Constitutional: no fever or chills  ENT: no nasal congestion or sore throat  Respiratory: no cough or shortness of breath  Cardiovascular: no chest pain or palpitations  Gastrointestinal: no nausea or vomiting, tolerating diet  Genitourinary: as per HPI  Hematologic/Lymphatic: no easy bruising or lymphadenopathy  Musculoskeletal: no arthralgias or myalgias  Neurological: no seizures or tremors  Behavioral/Psych: no auditory or visual hallucinations     Objective:   Vitals:   Vitals:    10/21/22 1338   BP: (!) 77/51   Pulse: 77   BP 86/42 on recheck (pt asymptomatic)    Physical Exam   General: alert and oriented, no acute distress  Head: normocephalic, atraumatic  Neck: supple, normal ROM  Respiratory: Symmetric expansion, non-labored breathing  Cardiovascular: regular rate and rhythm  Abdomen: soft, non tender, non distended  Genitourinary: deferred  Skin: normal coloration and turgor, no rashes, no suspicious skin lesions noted  Neuro: alert and oriented x3, no gross deficits  Psych: normal judgment and insight, normal mood/affect, and non-anxious    Lab " Review   Urinalysis demonstrates negative for all components  Lab Results   Component Value Date    WBC 8.00 08/15/2022    HGB 17.1 08/15/2022    HCT 48.9 08/15/2022    MCV 90 08/15/2022     08/15/2022     Lab Results   Component Value Date    CREATININE 0.84 08/15/2022    BUN 15 08/15/2022     No results found for: PSA  Imaging  None    Assessment:     1. Prostate cancer    2. OAB (overactive bladder)    3. Urinary urgency    4. Lower urinary tract symptoms      Plan:   Janak was seen today for annual visit.    Diagnoses and all orders for this visit:    Prostate cancer GENET* 10 years sp brachy  -     Prostate Specific Antigen, Diagnostic; Future    OAB (overactive bladder)  -     oxybutynin (DITROPAN-XL) 5 MG TR24; Take 1 tablet (5 mg total) by mouth once daily.  -     mirabegron (MYRBETRIQ) 25 mg Tb24 ER tablet; Take 1 tablet (25 mg total) by mouth once daily.    Urinary urgency  -     tamsulosin (FLOMAX) 0.4 mg Cap; Take 2 capsules (0.8 mg total) by mouth once daily.    Lower urinary tract symptoms  -     tamsulosin (FLOMAX) 0.4 mg Cap; Take 2 capsules (0.8 mg total) by mouth once daily.      Plan:  --Continue flomax, myrbetriq and ditropan. Discussed increasing the dose of myrebtriq, pt declines for now  --Follow up annually with PSA  --Discussed BP, pt asymptomatic will closely follow up with cardiology

## 2023-03-17 ENCOUNTER — TELEPHONE (OUTPATIENT)
Dept: UROLOGY | Facility: CLINIC | Age: 78
End: 2023-03-17
Payer: MEDICARE

## 2023-03-21 ENCOUNTER — TELEPHONE (OUTPATIENT)
Dept: UROLOGY | Facility: CLINIC | Age: 78
End: 2023-03-21
Payer: MEDICARE

## 2023-03-21 NOTE — TELEPHONE ENCOUNTER
Informed patient that any medication that needs a Prior Authorization can be transferred to an [facility] for same day refill.

## 2023-04-04 DIAGNOSIS — R39.15 URINARY URGENCY: ICD-10-CM

## 2023-04-04 DIAGNOSIS — R39.9 LOWER URINARY TRACT SYMPTOMS: ICD-10-CM

## 2023-04-04 DIAGNOSIS — N32.81 OAB (OVERACTIVE BLADDER): ICD-10-CM

## 2023-04-05 DIAGNOSIS — R39.9 LOWER URINARY TRACT SYMPTOMS: ICD-10-CM

## 2023-04-05 DIAGNOSIS — R39.15 URINARY URGENCY: ICD-10-CM

## 2023-04-05 DIAGNOSIS — N32.81 OAB (OVERACTIVE BLADDER): ICD-10-CM

## 2023-04-05 DIAGNOSIS — N52.9 ERECTILE DYSFUNCTION, UNSPECIFIED ERECTILE DYSFUNCTION TYPE: ICD-10-CM

## 2023-04-05 RX ORDER — OXYBUTYNIN CHLORIDE 5 MG/1
5 TABLET, EXTENDED RELEASE ORAL DAILY
Qty: 30 TABLET | Refills: 0 | Status: CANCELLED | OUTPATIENT
Start: 2023-04-05

## 2023-04-05 RX ORDER — MIRABEGRON 25 MG/1
25 TABLET, FILM COATED, EXTENDED RELEASE ORAL DAILY
Qty: 30 TABLET | Refills: 0 | Status: CANCELLED | OUTPATIENT
Start: 2023-04-05

## 2023-04-05 RX ORDER — OXYBUTYNIN CHLORIDE 5 MG/1
5 TABLET, EXTENDED RELEASE ORAL DAILY
Qty: 90 TABLET | Refills: 3 | Status: SHIPPED | OUTPATIENT
Start: 2023-04-05 | End: 2024-04-04

## 2023-04-05 RX ORDER — TAMSULOSIN HYDROCHLORIDE 0.4 MG/1
2 CAPSULE ORAL DAILY
Qty: 60 CAPSULE | Refills: 0 | Status: CANCELLED | OUTPATIENT
Start: 2023-04-05

## 2023-04-05 RX ORDER — TAMSULOSIN HYDROCHLORIDE 0.4 MG/1
2 CAPSULE ORAL DAILY
Qty: 180 CAPSULE | Refills: 3 | Status: SHIPPED | OUTPATIENT
Start: 2023-04-05 | End: 2024-04-04

## 2023-04-05 RX ORDER — MIRABEGRON 25 MG/1
25 TABLET, FILM COATED, EXTENDED RELEASE ORAL DAILY
Qty: 90 TABLET | Refills: 3 | Status: SHIPPED | OUTPATIENT
Start: 2023-04-05 | End: 2024-04-04

## 2023-04-05 NOTE — TELEPHONE ENCOUNTER
Pt called about his Rx and his insurance not being excepted at the [facility]. Pt was very rude, fussing and upset about the Pharmacy not taking his insurance. I asked the pt if he had another pharmacy he would like to send the Rx to and he said yes [facility] in [place]. He continued to fuss about [facility]'s pharmacy and said since I work for [facility] than I should do something about it. Explained to pt that I work in office and not with the insurance and I could give him a number to call and explain what is going on and he refused.

## 2023-04-20 ENCOUNTER — OFFICE VISIT (OUTPATIENT)
Dept: UROLOGY | Facility: CLINIC | Age: 78
End: 2023-04-20
Payer: MEDICARE

## 2023-04-20 VITALS
DIASTOLIC BLOOD PRESSURE: 68 MMHG | SYSTOLIC BLOOD PRESSURE: 138 MMHG | HEART RATE: 67 BPM | WEIGHT: 224.44 LBS | HEIGHT: 73 IN | BODY MASS INDEX: 29.74 KG/M2

## 2023-04-20 DIAGNOSIS — R39.15 URINARY URGENCY: ICD-10-CM

## 2023-04-20 DIAGNOSIS — N39.41 URGE INCONTINENCE: Primary | ICD-10-CM

## 2023-04-20 PROCEDURE — 99213 PR OFFICE/OUTPT VISIT, EST, LEVL III, 20-29 MIN: ICD-10-PCS | Mod: S$GLB,,, | Performed by: NURSE PRACTITIONER

## 2023-04-20 PROCEDURE — 99213 OFFICE O/P EST LOW 20 MIN: CPT | Mod: S$GLB,,, | Performed by: NURSE PRACTITIONER

## 2023-04-20 NOTE — PROGRESS NOTES
Subjective:      Janak Martin is a 77 y.o. male who returns today regarding his urinary urgency.     The patient is approximately 11 years s/p brachii therapy for his prostate cancer (done at , no records available). PSA remains <0.1.      Component      Latest Ref Rng & Units 10/16/2022   PSA Total      0.00 - 4.00 ng/mL <0.01   PSA, Free      0.00 - 1.50 ng/mL <0.10   PSA, Free %      Not established % Unable to calculate      Currently taking ditropan 5 mg XL, myrbetriq 25 mg and flomax for his urinary symptoms.    Reports worsening urinary urgency and occasional urge incontinence. Also with nocturia several times per night. Limits PM fluids. Denies dysuria and hematuria.     The following portions of the patient's history were reviewed and updated as appropriate: allergies, current medications, past family history, past medical history, past social history, past surgical history and problem list.    Review of Systems  Constitutional: no fever or chills  ENT: no nasal congestion or sore throat  Respiratory: no cough or shortness of breath  Cardiovascular: no chest pain or palpitations  Gastrointestinal: no nausea or vomiting, tolerating diet  Genitourinary: as per HPI  Hematologic/Lymphatic: no easy bruising or lymphadenopathy  Musculoskeletal: no arthralgias or myalgias  Neurological: no seizures or tremors  Behavioral/Psych: no auditory or visual hallucinations     Objective:   Vitals:   Vitals:    04/20/23 1046   BP: 138/68   Pulse: 67     Physical Exam   General: alert and oriented, no acute distress  Head: normocephalic, atraumatic  Neck: supple, normal ROM  Respiratory: Symmetric expansion, non-labored breathing  Cardiovascular: regular rate and rhythm  Abdomen: soft, non tender, non distended  Genitourinary: deferred  Skin: normal coloration and turgor, no rashes, no suspicious skin lesions noted  Neuro: alert and oriented x3, no gross deficits  Psych: normal judgment and insight, normal mood/affect,  and non-anxious    Lab Review   Urinalysis demonstrates negative for all components  Lab Results   Component Value Date    WBC 8.00 08/15/2022    HGB 17.1 08/15/2022    HCT 48.9 08/15/2022    MCV 90 08/15/2022     08/15/2022     Lab Results   Component Value Date    CREATININE 0.84 08/15/2022    BUN 15 08/15/2022     No results found for: PSA  Imaging   None    Assessment:     1. Urge incontinence    2. Urinary urgency      Plan:   Janak was seen today for urinary frequency.    Diagnoses and all orders for this visit:    Urge incontinence  -     mirabegron (MYRBETRIQ) 50 mg Tb24; Take 1 tablet (50 mg total) by mouth once daily.    Urinary urgency    Plan:  --UA negative for infection  --Continue Flomax  --Increase myrbetriq to 50 mg XL and continue ditropan 5 mg XL  --Pt will message with symptom update   --Discussed common bladder irritants such as caffeine, alcohol, citrus, and acidic and spicy foods. Encouraged to minimize in diet to reduce symptoms.  --Follow up as previously scheduled

## 2023-10-10 ENCOUNTER — LAB VISIT (OUTPATIENT)
Dept: LAB | Facility: HOSPITAL | Age: 78
End: 2023-10-10
Attending: NURSE PRACTITIONER
Payer: MEDICARE

## 2023-10-10 DIAGNOSIS — C61 PROSTATE CANCER: ICD-10-CM

## 2023-10-10 LAB — COMPLEXED PSA SERPL-MCNC: <0.01 NG/ML (ref 0–4)

## 2023-10-10 PROCEDURE — 84153 ASSAY OF PSA TOTAL: CPT | Performed by: NURSE PRACTITIONER

## 2023-10-10 PROCEDURE — 36415 COLL VENOUS BLD VENIPUNCTURE: CPT | Performed by: NURSE PRACTITIONER

## 2023-11-08 ENCOUNTER — TELEPHONE (OUTPATIENT)
Dept: UROLOGY | Facility: CLINIC | Age: 78
End: 2023-11-08
Payer: MEDICARE

## 2023-11-08 ENCOUNTER — OFFICE VISIT (OUTPATIENT)
Dept: UROLOGY | Facility: CLINIC | Age: 78
End: 2023-11-08
Payer: MEDICARE

## 2023-11-08 VITALS
SYSTOLIC BLOOD PRESSURE: 118 MMHG | OXYGEN SATURATION: 95 % | BODY MASS INDEX: 29.3 KG/M2 | HEART RATE: 68 BPM | WEIGHT: 222.13 LBS | DIASTOLIC BLOOD PRESSURE: 70 MMHG

## 2023-11-08 DIAGNOSIS — C61 PROSTATE CANCER: Primary | ICD-10-CM

## 2023-11-08 DIAGNOSIS — N32.81 OAB (OVERACTIVE BLADDER): ICD-10-CM

## 2023-11-08 PROCEDURE — 99214 OFFICE O/P EST MOD 30 MIN: CPT | Mod: S$GLB,,, | Performed by: STUDENT IN AN ORGANIZED HEALTH CARE EDUCATION/TRAINING PROGRAM

## 2023-11-08 PROCEDURE — 99214 PR OFFICE/OUTPT VISIT, EST, LEVL IV, 30-39 MIN: ICD-10-PCS | Mod: S$GLB,,, | Performed by: STUDENT IN AN ORGANIZED HEALTH CARE EDUCATION/TRAINING PROGRAM

## 2023-11-08 NOTE — TELEPHONE ENCOUNTER
----- Message from Ronnie Duval MD sent at 11/8/2023 10:08 AM CST -----  Can you please arrange for this patient to get a PSA in 1 year, and to see an PAULINO after?    Thanks,  Ronnie Duval

## 2023-11-08 NOTE — PROGRESS NOTES
"[facility]   Clinic Note    SUBJECTIVE:     Chief Complaint: OAB    History of Present Illness:  Janak Martin is a 78 y.o. male who presents to clinic for OAB. He is established to our clinic and was last seen 04/2023 by SELENE Sidhu .     Treated with brachytherapy for prostate cancer in ~2011 at [facility] (records unavailable.) PSA remains undetectable.  For LUTS, on Flomax, Myrbetriq 25 mg, and Ditropan XL 5 mg. At last appointment was reporting worsening urgency with UUI on this regimen; Myrbetriq was increased to 50 mg.  He subsequently reported difficulty urinating with this, so dose was decreased back to 25 mg. Symptoms have been better than they've been in a long time. He is not interested in surgical management of OAB.      Today patient reports LUTS are well controlled - frequency, urgency. No obstructive symptoms. Prior cystoscopy with Dr. Miranda in 2021 showed trilobar prostatic enlargement, small IV median lobe, some necrotic tissue from prostate. Symptoms were felt to be related to the necrotic tissue        OBJECTIVE:     Estimated body mass index is 29.3 kg/m² as calculated from the following:    Height as of 4/20/23: 6' 1" (1.854 m).    Weight as of this encounter: 100.8 kg (222 lb 1.8 oz).    Vital Signs (Most Recent)  Pulse: 68 (11/08/23 0947)  BP: 118/70 (11/08/23 0947)  SpO2: 95 % (11/08/23 0947)    Physical Exam  Constitutional:       Appearance: Normal appearance.   HENT:      Head: Normocephalic and atraumatic.   Eyes:      Conjunctiva/sclera: Conjunctivae normal.   Cardiovascular:      Rate and Rhythm: Normal rate.   Pulmonary:      Effort: Pulmonary effort is normal.   Abdominal:      General: Abdomen is flat. There is no distension.      Tenderness: There is no abdominal tenderness.   Musculoskeletal:         General: Normal range of motion.   Skin:     General: Skin is warm and dry.   Neurological:      General: No focal deficit present.      Mental Status: He is alert and " oriented to person, place, and time.   Psychiatric:         Mood and Affect: Mood normal.         Behavior: Behavior normal.         Thought Content: Thought content normal.         Judgment: Judgment normal.         Lab Results   Component Value Date    BUN 15 08/15/2022    CREATININE 0.84 08/15/2022    WBC 8.00 08/15/2022    HGB 17.1 08/15/2022    HCT 48.9 08/15/2022     08/15/2022    AST 52 08/15/2022    ALT 45 (H) 08/15/2022    ALKPHOS 104 08/15/2022    ALBUMIN 4.1 08/15/2022        Lab Results   Component Value Date    PSADIAG <0.01 10/10/2023    PSADIAG <0.01 10/16/2022    PSAFREE <0.10 10/16/2022    PSAFREEPCT Unable to calculate 10/16/2022       ASSESSMENT     1. Prostate cancer      PLAN:   1. Prostate cancer  -     PROSTATE SPECIFIC ANTIGEN, DIAGNOSTIC; Future; Expected date: 11/08/2023       Patient satisfied with how he voids currently. Continue Flomax, Ditropan XL 5 mg, and Myrbetriq 25 mg daily.  RTC 1 year with PAULINO with repeat PSA.    Ronnie Duval MD

## 2024-04-23 DIAGNOSIS — R39.9 LOWER URINARY TRACT SYMPTOMS: ICD-10-CM

## 2024-04-23 DIAGNOSIS — R39.15 URINARY URGENCY: ICD-10-CM

## 2024-04-23 RX ORDER — TAMSULOSIN HYDROCHLORIDE 0.4 MG/1
2 CAPSULE ORAL
Qty: 180 CAPSULE | Refills: 0 | Status: SHIPPED | OUTPATIENT
Start: 2024-04-23

## 2024-04-30 ENCOUNTER — TELEPHONE (OUTPATIENT)
Dept: UROLOGY | Facility: CLINIC | Age: 79
End: 2024-04-30
Payer: MEDICARE

## 2024-04-30 NOTE — TELEPHONE ENCOUNTER
Appt scheduled to discuss medication.  ----- Message from Thuy Harley LPN sent at 4/30/2024  9:48 AM CDT -----    ----- Message -----  From: Linnette Arevalo  Sent: 4/30/2024   9:41 AM CDT  To: Mukund Trujillo Staff     Name of Who is Calling:     What is the request in detail:  patient request call back in reference to schedule appointment Please contact to further discuss and advise      Can the clinic reply by [facility]:     What Number to Call Back if not in [facility]:   [phone]

## 2024-05-08 ENCOUNTER — LAB VISIT (OUTPATIENT)
Dept: LAB | Facility: HOSPITAL | Age: 79
End: 2024-05-08
Attending: STUDENT IN AN ORGANIZED HEALTH CARE EDUCATION/TRAINING PROGRAM
Payer: MEDICARE

## 2024-05-08 DIAGNOSIS — C61 PROSTATE CANCER: ICD-10-CM

## 2024-05-08 LAB — COMPLEXED PSA SERPL-MCNC: <0.01 NG/ML (ref 0–4)

## 2024-05-08 PROCEDURE — 84153 ASSAY OF PSA TOTAL: CPT | Performed by: STUDENT IN AN ORGANIZED HEALTH CARE EDUCATION/TRAINING PROGRAM

## 2024-05-08 PROCEDURE — 36415 COLL VENOUS BLD VENIPUNCTURE: CPT | Performed by: STUDENT IN AN ORGANIZED HEALTH CARE EDUCATION/TRAINING PROGRAM

## 2024-05-09 ENCOUNTER — OFFICE VISIT (OUTPATIENT)
Dept: UROLOGY | Facility: CLINIC | Age: 79
End: 2024-05-09
Payer: MEDICARE

## 2024-05-09 VITALS — HEIGHT: 73 IN | WEIGHT: 222.25 LBS | BODY MASS INDEX: 29.46 KG/M2

## 2024-05-09 DIAGNOSIS — R35.1 NOCTURIA: ICD-10-CM

## 2024-05-09 DIAGNOSIS — R82.90 ABNORMAL URINALYSIS: Primary | ICD-10-CM

## 2024-05-09 DIAGNOSIS — N32.81 OAB (OVERACTIVE BLADDER): ICD-10-CM

## 2024-05-09 DIAGNOSIS — C61 PROSTATE CANCER: ICD-10-CM

## 2024-05-09 LAB
BACTERIA #/AREA URNS AUTO: ABNORMAL /HPF
MICROSCOPIC COMMENT: ABNORMAL
RBC #/AREA URNS AUTO: >100 /HPF (ref 0–4)
WBC #/AREA URNS AUTO: 44 /HPF (ref 0–5)

## 2024-05-09 PROCEDURE — 87086 URINE CULTURE/COLONY COUNT: CPT | Performed by: NURSE PRACTITIONER

## 2024-05-09 PROCEDURE — 81001 URINALYSIS AUTO W/SCOPE: CPT | Performed by: NURSE PRACTITIONER

## 2024-05-09 PROCEDURE — 99213 OFFICE O/P EST LOW 20 MIN: CPT | Mod: S$GLB,,, | Performed by: NURSE PRACTITIONER

## 2024-05-09 NOTE — PROGRESS NOTES
Subjective:      Janak Martin is a 78 y.o. male who returns today to discuss medications.     The patient is approximately 11 years s/p brachii therapy for his prostate cancer (done at , no records available).     Component      Latest Ref Rng 5/8/2024   PSA Diagnostic      0.00 - 4.00 ng/mL <0.01      Remains on ditropan 5 mg XL, myrbetriq 50 mg and flomax for his urinary symptoms.     Reports worsening urinary urgency and occasional urge incontinence in the last several months. Also with nocturia several times per night- intermittency to flow- often overnight. Limits PM fluids. Denies dysuria and hematuria.     The following portions of the patient's history were reviewed and updated as appropriate: allergies, current medications, past family history, past medical history, past social history, past surgical history and problem list.    Review of Systems  Constitutional: no fever or chills  ENT: no nasal congestion or sore throat  Respiratory: no cough or shortness of breath  Cardiovascular: no chest pain or palpitations  Gastrointestinal: no nausea or vomiting, tolerating diet  Genitourinary: as per HPI  Hematologic/Lymphatic: no easy bruising or lymphadenopathy  Musculoskeletal: no arthralgias or myalgias  Neurological: no seizures or tremors  Behavioral/Psych: no auditory or visual hallucinations     Objective:   Vitals: There were no vitals filed for this visit.    Physical Exam   General: alert and oriented, no acute distress  Head: normocephalic, atraumatic  Neck: supple, normal ROM  Respiratory: Symmetric expansion, non-labored breathing  Cardiovascular: regular rate and rhythm  Abdomen: soft, non tender, non distended  Genitourinary: deferred  Skin: normal coloration and turgor, no rashes, no suspicious skin lesions noted  Neuro: alert and oriented x3, no gross deficits  Psych: normal judgment and insight, normal mood/affect, and non-anxious    Lab Review   Urinalysis demonstrates positive for red blood  "cells  Lab Results   Component Value Date    WBC 8.00 08/15/2022    HGB 17.1 08/15/2022    HCT 48.9 08/15/2022    MCV 90 08/15/2022     08/15/2022     Lab Results   Component Value Date    CREATININE 0.84 08/15/2022    BUN 15 08/15/2022     No results found for: "PSA"  Imaging   None    Assessment:     1. Abnormal urinalysis    2. Prostate cancer    3. OAB (overactive bladder)    4. Nocturia      Plan:   Janak"Misael" was seen today for urinary frequency.    Diagnoses and all orders for this visit:    Abnormal urinalysis  -     Urine culture  -     Urinalysis Microscopic    Prostate cancer    OAB (overactive bladder)    Nocturia      Plan:  --PSA remains undetectable!  --Continue flomax, myrbetriq and ditropan  --UA with significant RBCS- sent for culture and micro UA   --Will likely need full hematuria workup pending above    "

## 2024-05-10 LAB — BACTERIA UR CULT: NORMAL

## 2024-05-12 DIAGNOSIS — N32.81 OAB (OVERACTIVE BLADDER): ICD-10-CM

## 2024-05-13 DIAGNOSIS — R31.29 MICROHEMATURIA: Primary | ICD-10-CM

## 2024-05-13 RX ORDER — OXYBUTYNIN CHLORIDE 5 MG/1
5 TABLET, EXTENDED RELEASE ORAL
Qty: 90 TABLET | Refills: 0 | Status: SHIPPED | OUTPATIENT
Start: 2024-05-13

## 2024-05-17 ENCOUNTER — TELEPHONE (OUTPATIENT)
Dept: UROLOGY | Facility: CLINIC | Age: 79
End: 2024-05-17
Payer: MEDICARE

## 2024-05-17 NOTE — TELEPHONE ENCOUNTER
Appt scheduled for Thursday per pt request.    ----- Message from Shannon Mayfield RN sent at 5/17/2024 10:17 AM CDT -----    ----- Message -----  From: Jenn Sidhu NP  Sent: 5/17/2024   8:22 AM CDT  To: Ruben Bain Staff    Please schedule ASAP with Dr. Miranda regarding renal mass

## 2024-05-22 NOTE — PROGRESS NOTES
Subjective:      Janak Martin is a 78 y.o. male who returns today regarding his     Here to review CT results    Had gross hematuria a few weeks ago  None recently   Urine clear now  .    The following portions of the patient's history were reviewed and updated as appropriate: allergies, current medications, past family history, past medical history, past social history, past surgical history and problem list.    Review of Systems  Pertinent items are noted in HPI.  A comprehensive multipoint review of systems was negative except as otherwise stated in the HPI.    Past Medical History:   Diagnosis Date    Atrial fibrillation     Frequency of urination     Hypertension     Prostate cancer     Urinary frequency 05/02/2017     Past Surgical History:   Procedure Laterality Date    ANKLE SURGERY      Brachytherapy      CYSTOSCOPY      dorsal column stimulator      ELBOW SURGERY      EXTRACTION OF TOOTH  2022    3 times in the past year    HERNIA REPAIR      KNEE SURGERY         Review of patient's allergies indicates:  No Known Allergies       Objective:   Vitals: There were no vitals taken for this visit.    Physical Exam   General: alert and oriented, no acute distress  Respiratory: Symmetric expansion, non-labored breathing  Cardiovascular: no peripheral edema  Abdomen: soft, non distended  Skin: normal coloration and turgor, no rashes, no suspicious skin lesions noted  Neuro: no gross deficits  Psych: normal judgment and insight, normal mood/affect, and non-anxious    Physical Exam    Lab Review   Urinalysis demonstrates negative for all components  Lab Results   Component Value Date    WBC 8.00 08/15/2022    HGB 17.1 08/15/2022    HCT 48.9 08/15/2022    MCV 90 08/15/2022     08/15/2022     Lab Results   Component Value Date    CREATININE 1.12 05/16/2024    BUN 23 (H) 05/16/2024       Imaging  CT UROGRAM ABD PELVIS W WO     CLINICAL HISTORY:  Other microscopic hematuriaHematuria, microscopic, increased risk  for urinary tract malignancy;     TECHNIQUE:  Axial CT images were obtained. Iterative reconstruction technique was used. CT/cardiac nuclear exam/s in prior 12 months: 0.     COMPARISON:  None.     FINDINGS:  Precontrast images demonstrate no renal stones or hydronephrosis.  No abnormal parenchymal calcifications.     Postcontrast images demonstrate several right renal cysts, largest measuring 3.7 cm.  In the anterior aspect of the lower pole of the right kidney, a solid enhancing mass measuring 2 cm (images  of series 4 axial).  In the mid lateral aspect of the left kidney, a 2.7 cm cyst.  No filling defects identified in the opacified portions of either renal collecting system.  No abnormality of urinary bladder.  Radiation prostate therapy noted.     No hepatic abnormality.  No gallstones.  Spleen, pancreas, adrenal glands demonstrate no abnormality.  No gross gastric abnormality.  No dilated bowel.  Colonic diverticulosis.  Prior ventral hernia repair.  A small fat containing umbilical hernia.  Moderate size fat containing right inguinal hernia.  There is no free fluid or free air.  No aortic aneurysm.  No lymph node enlargement.  Visualized lung bases are clear.  No osseous abnormality.  Neurostimulator device noted with generator along the right lateral abdominal wall and leads extending into the thoracic spine.     Impression:     1. A solid enhancing mass in the left kidney measuring 2 cm, suspicious for malignancy.  2. No evidence of metastatic disease.        Electronically signed by:Edgar Wilcox MD  Date:                                            05/16/2024  Time:                                           16:58        Assessment and Plan:   Left renal mass; possible hyperdense cyst    We discussed the natural history of small renal masses, the risk of malignancy and disease progression, and the small risk of mortality.  We discussed the risks and benefits of watchful waiting, biopsy, partial  and total nephrectomy.  We discussed the benefits of renal preservation when possible.  We discussed percutaneous, laparoscopic and robotic approaches.  We discussed the management of positive surgical margins.  I answered all questions.    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

## 2024-05-23 ENCOUNTER — OFFICE VISIT (OUTPATIENT)
Dept: UROLOGY | Facility: CLINIC | Age: 79
End: 2024-05-23
Payer: MEDICARE

## 2024-05-23 VITALS — BODY MASS INDEX: 29.46 KG/M2 | HEIGHT: 73 IN | WEIGHT: 222.25 LBS

## 2024-05-23 DIAGNOSIS — N28.89 LEFT RENAL MASS: Primary | ICD-10-CM

## 2024-05-23 PROCEDURE — 99215 OFFICE O/P EST HI 40 MIN: CPT | Mod: S$GLB,,, | Performed by: UROLOGY

## 2024-05-23 NOTE — Clinical Note
Dr Wilcox, in the body of Mr Hany's Ct report, it mentions RIGHT.  However, I believe the lesion in question is in the left kidney.  Can you review the images and make a correction if appropriate?  Thanks, Odell Miranda [phone]

## 2024-05-30 ENCOUNTER — HOSPITAL ENCOUNTER (OUTPATIENT)
Dept: RADIOLOGY | Facility: HOSPITAL | Age: 79
Discharge: HOME | End: 2024-05-30
Attending: UROLOGY
Payer: MEDICARE

## 2024-05-30 DIAGNOSIS — N28.89 LEFT RENAL MASS: ICD-10-CM

## 2024-05-30 PROCEDURE — 76770 US EXAM ABDO BACK WALL COMP: CPT | Mod: TC

## 2024-05-30 PROCEDURE — 76770 US EXAM ABDO BACK WALL COMP: CPT | Mod: 26,,, | Performed by: RADIOLOGY

## 2024-05-31 ENCOUNTER — TELEPHONE (OUTPATIENT)
Dept: PODIATRY | Facility: CLINIC | Age: 79
End: 2024-05-31
Payer: MEDICARE

## 2024-05-31 NOTE — TELEPHONE ENCOUNTER
----- Message from Karie Amato sent at 5/31/2024 10:03 AM CDT -----  Needs advice from nurse:      Who Called:pt  Regarding:pt had an appt with Dr Walsh on 6/20, however, he asked to be rescheduled for a sooner one with her and there was nothing available with anyone, the soonest was a 6/6 appt at [facility] with a different provider which he is now scheduled for, however pt prefers to see Dr Walsh, if possible, sooner than the 6/20 appt//patient has a painful heel spur on left foot  Would the patient rather a call back or [email or web address]?  Best Call Back number:[phone]  Additional Info:

## 2024-06-02 DIAGNOSIS — N32.81 OAB (OVERACTIVE BLADDER): ICD-10-CM

## 2024-06-03 RX ORDER — MIRABEGRON 25 MG/1
25 TABLET, FILM COATED, EXTENDED RELEASE ORAL
Qty: 90 TABLET | Refills: 0 | Status: SHIPPED | OUTPATIENT
Start: 2024-06-03

## 2024-06-06 ENCOUNTER — OFFICE VISIT (OUTPATIENT)
Dept: PODIATRY | Facility: CLINIC | Age: 79
End: 2024-06-06
Payer: MEDICARE

## 2024-06-06 VITALS
BODY MASS INDEX: 29.32 KG/M2 | SYSTOLIC BLOOD PRESSURE: 91 MMHG | HEART RATE: 63 BPM | DIASTOLIC BLOOD PRESSURE: 52 MMHG | HEIGHT: 73 IN

## 2024-06-06 DIAGNOSIS — L60.9 DISEASE OF NAIL: ICD-10-CM

## 2024-06-06 DIAGNOSIS — M72.2 PLANTAR FASCIITIS: Primary | ICD-10-CM

## 2024-06-06 DIAGNOSIS — B35.1 ONYCHOMYCOSIS DUE TO DERMATOPHYTE: ICD-10-CM

## 2024-06-06 PROCEDURE — 99214 OFFICE O/P EST MOD 30 MIN: CPT | Mod: PBBFAC,PN,25 | Performed by: STUDENT IN AN ORGANIZED HEALTH CARE EDUCATION/TRAINING PROGRAM

## 2024-06-06 PROCEDURE — 99203 OFFICE O/P NEW LOW 30 MIN: CPT | Mod: 25,S$PBB,, | Performed by: STUDENT IN AN ORGANIZED HEALTH CARE EDUCATION/TRAINING PROGRAM

## 2024-06-06 PROCEDURE — 99999PBSHW PR PBB SHADOW TECHNICAL ONLY FILED TO HB: Mod: PBBFAC,,,

## 2024-06-06 PROCEDURE — 20550 NJX 1 TENDON SHEATH/LIGAMENT: CPT | Mod: PBBFAC,PN,LT | Performed by: STUDENT IN AN ORGANIZED HEALTH CARE EDUCATION/TRAINING PROGRAM

## 2024-06-06 PROCEDURE — 99999 PR PBB SHADOW E&M-EST. PATIENT-LVL IV: CPT | Mod: PBBFAC,,, | Performed by: STUDENT IN AN ORGANIZED HEALTH CARE EDUCATION/TRAINING PROGRAM

## 2024-06-06 RX ORDER — METHYLPREDNISOLONE ACETATE 40 MG/ML
40 INJECTION, SUSPENSION INTRA-ARTICULAR; INTRALESIONAL; INTRAMUSCULAR; SOFT TISSUE
Status: COMPLETED | OUTPATIENT
Start: 2024-06-06 | End: 2024-06-06

## 2024-06-06 RX ADMIN — METHYLPREDNISOLONE ACETATE 40 MG: 40 INJECTION, SUSPENSION INTRA-ARTICULAR; INTRALESIONAL; INTRAMUSCULAR; SOFT TISSUE at 08:06

## 2024-06-06 NOTE — PATIENT INSTRUCTIONS
Consider shoes from [email or web address] on [address] by jasmin olmedo or [employer] tennis shoes.

## 2024-06-06 NOTE — PROCEDURES
Tendon Sheath: L plantar fascia    Date/Time: 6/6/2024 8:45 AM    Performed by: Olivia Walsh DPM  Authorized by: Olivia Walsh DPM    Consent Done?:  Yes (Verbal)  Indications:  Pain  Location:  Foot  Site:  L plantar fascia  Needle size:  25 G  Approach:  Plantar  Medications:  40 mg methylPREDNISolone acetate (DEPO-MEDROL) injection 40 mg/mL

## 2024-06-06 NOTE — PROGRESS NOTES
Subjective:     Patient ID: Janak Martin is a 78 y.o. male.    Chief Complaint: Foot Problem     Janak is a 78 y.o. male who presents to the clinic complaining of heel pain in the left foot, especially with the first step in the morning. The pain is described as Sharp. The onset of the pain was gradual and has worsened over the past several weeks. Janak rates the pain as 10/10. He denies a history of trauma. Prior treatments include corticosteroid injections in the past which helped. He is also complaining of thickened toenails.     Review of Systems   Constitutional: Negative for chills, decreased appetite, diaphoresis and fever.   HENT:  Negative for congestion and hearing loss.    Cardiovascular:  Negative for chest pain, claudication, leg swelling and syncope.   Respiratory:  Negative for cough and shortness of breath.    Skin:  Positive for dry skin and nail changes. Negative for color change, flushing, itching, poor wound healing and rash.   Musculoskeletal:  Negative for arthritis, back pain, joint pain and joint swelling.   Gastrointestinal:  Negative for nausea and vomiting.   Neurological:  Negative for focal weakness, numbness, paresthesias and weakness.   Psychiatric/Behavioral:  Negative for altered mental status. The patient is not nervous/anxious.         Objective:     Physical Exam  Constitutional:       General: He is not in acute distress.     Appearance: Normal appearance. He is well-developed. He is not diaphoretic.   Cardiovascular:      Comments: Dorsalis pedis and posterior tibial pulses are within normal limits. Skin temperature is within normal limits. Toes are cool to touch and feet are warm proximally. Hair growth is within normal limits. Skin is normotrophic and without hyperpigmentation. No edema noted. No varicosities or spider veins noted, bilaterally.       Musculoskeletal:         General: Tenderness present.      Comments: Adequate joint range of motion without pain, limitation,  "nor crepitation to foot and ankle joints. Muscle strength is 5/5 in all groups bilaterally.    Tenderness to left plantar heel at insertion of plantar fascia to medial tuberosity of the calcaneus       Feet:      Right foot:      Skin integrity: Dry skin present. No ulcer, blister, erythema or warmth.      Left foot:      Skin integrity: Dry skin present. No ulcer, blister, erythema or warmth.   Skin:     General: Skin is warm and dry.      Capillary Refill: Capillary refill takes less than 2 seconds.      Comments: Skin is warm and dry, no acute signs of infection noted bilaterally        Toenails are thickened by 2-4 mm's, dystrophic, and are darkened in coloration with subungual fungal debris.     Otherwise, no open wounds, macerations or hyperkeratotic lesions, bilaterally            Neurological:      Mental Status: He is alert and oriented to person, place, and time.      Motor: No abnormal muscle tone.      Comments: Light touch within normal limits.    Psychiatric:         Mood and Affect: Mood normal.         Behavior: Behavior normal.         Thought Content: Thought content normal.           Assessment:      Encounter Diagnoses   Name Primary?    Plantar fasciitis Yes    Disease of nail     Onychomycosis due to dermatophyte      Plan:     Janak Pruitt" was seen today for foot problem and heel pain.    Diagnoses and all orders for this visit:    Plantar fasciitis  -     Tendon Sheath: L plantar fascia    Disease of nail    Onychomycosis due to dermatophyte    Other orders  -     methylPREDNISolone acetate injection 40 mg      I counseled the patient on his conditions, their implications and medical management.  Declines xray  RICE, NSAIDs PRN pain  Supportive tennis shoes with arch supports at all times while ambulating  Stretching and strengthening exercises dispensed. Rx PT  He elects for corticosteroid injection to heel. Discussed possible side effects from injection including but NOT limited to " discoloration of skin, erosion of soft tissue, and increased likelihood of rupture of a soft tissue structure (ie. Plantar fascia, muscle, tendon, ligament, or capsule in the area of injection). Patient indicates understanding of my explanation, and patient gives verbal consent for injection of affected area. A time out was performed to verify the  correct  patient and site, prior to initiation of procedure.  Injection administered, see procedure note  Discussed importance of keeping feet dry and changing socks and shoes on a regular basis to prevent spread of toenail fungus. Discussed treatment options for fungal toenails including topical home remedies, topical over the counter and prescription medications as well as oral prescription medications and laser treatment. All pros and cons discussed of each treatment. Patient elects for topical treatment. Script dispensed from [facility].   I advised him on keeping nails neatly trimmed, removing all sharp and loose edges, filing the nail as necessary to prevent damage to nail plate and prevent unnecessary pulling of toenail. Also advised to avoid tight fitting shoes to prevent pressure related issues. Recommend shoes with wide toe box or open toed shoe to reduce pressure.   Return to clinic 6-8 weeks, sooner PRN

## 2024-06-07 ENCOUNTER — PROCEDURE VISIT (OUTPATIENT)
Dept: UROLOGY | Facility: CLINIC | Age: 79
End: 2024-06-07
Payer: MEDICARE

## 2024-06-07 ENCOUNTER — TELEPHONE (OUTPATIENT)
Dept: UROLOGY | Facility: CLINIC | Age: 79
End: 2024-06-07

## 2024-06-07 VITALS — HEART RATE: 70 BPM | SYSTOLIC BLOOD PRESSURE: 99 MMHG | DIASTOLIC BLOOD PRESSURE: 51 MMHG

## 2024-06-07 DIAGNOSIS — R31.29 MICROHEMATURIA: ICD-10-CM

## 2024-06-07 DIAGNOSIS — N28.89 RENAL MASS, LEFT: Primary | ICD-10-CM

## 2024-06-07 PROCEDURE — 52000 CYSTOURETHROSCOPY: CPT | Mod: S$GLB,,, | Performed by: UROLOGY

## 2024-06-07 RX ORDER — LIDOCAINE HYDROCHLORIDE 20 MG/ML
JELLY TOPICAL
Status: COMPLETED | OUTPATIENT
Start: 2024-06-07 | End: 2024-06-07

## 2024-06-07 RX ORDER — ISOSORBIDE MONONITRATE 30 MG/1
30 TABLET, EXTENDED RELEASE ORAL
COMMUNITY
Start: 2024-05-16

## 2024-06-07 RX ORDER — AMIODARONE HYDROCHLORIDE 200 MG/1
1 TABLET ORAL DAILY
COMMUNITY
Start: 2024-03-20 | End: 2025-03-20

## 2024-06-07 RX ORDER — CIPROFLOXACIN 500 MG/1
500 TABLET ORAL
Status: COMPLETED | OUTPATIENT
Start: 2024-06-07 | End: 2024-06-07

## 2024-06-07 RX ORDER — AMLODIPINE BESYLATE 5 MG/1
5 TABLET ORAL
COMMUNITY

## 2024-06-07 RX ORDER — LEVOCETIRIZINE DIHYDROCHLORIDE 5 MG/1
5 TABLET, FILM COATED ORAL EVERY EVENING
COMMUNITY

## 2024-06-07 RX ORDER — HYDROXYZINE HYDROCHLORIDE 25 MG/1
25 TABLET, FILM COATED ORAL 3 TIMES DAILY PRN
COMMUNITY

## 2024-06-07 RX ADMIN — CIPROFLOXACIN 500 MG: 500 TABLET ORAL at 10:06

## 2024-06-07 RX ADMIN — LIDOCAINE HYDROCHLORIDE: 20 JELLY TOPICAL at 10:06

## 2024-06-07 NOTE — PROCEDURES
Cystoscopy    Date/Time: 6/7/2024 10:07 AM    Performed by: Odell Miranda MD  Authorized by: Jenn Sidhu NP    Consent Done?:  Yes (Written)  Timeout: prior to procedure the correct patient, procedure, and site was verified    Prep: patient was prepped and draped in usual sterile fashion    Local anesthesia used?: Yes    Preparation: Patient was prepped and draped in usual sterile fashion    Scope type:  Flexible cystoscope  Urethra normal: Yes    Prostate normal: No     Positive Varices  Bladder normal: Yes     patient tolerated the procedure well with no immediate complications  Comments:      Submucosal varicosities at prostate and bladder neck consistent with radiation cystitits; no active bleeding    US and CT reviewed; small 2cm left renal mass    Hematuria due to radiation cystitis resolved  Left renal mass small  PCA GENET sp brachy  OAB    Discussed small renal mass and chose active surveillance    RTC 6 months with renal US

## 2024-06-11 ENCOUNTER — TELEPHONE (OUTPATIENT)
Dept: PODIATRY | Facility: CLINIC | Age: 79
End: 2024-06-11
Payer: MEDICARE

## 2024-06-11 NOTE — TELEPHONE ENCOUNTER
----- Message from Akosua Rizvi sent at 6/11/2024  4:01 PM CDT -----  Type:  Sooner Appointment Request    Caller is requesting a sooner appointment.  Caller declined first available appointment listed below.  Caller will not accept being placed on the waitlist and is requesting a message be sent to doctor.  Name of Caller:pt  When is the first available appointment?06/21/24  Symptoms:Heel Spur  Would the patient rather a call back or a response via [facility]? call  Best Call Back Number: [phone]  Additional Information:

## 2024-06-11 NOTE — TELEPHONE ENCOUNTER
Called pt back and he ask me to speak to his wife. She said he was seen last week Thursday by Dr. Walsh and received an injection, but that didn't help. He is still in a lot of pain and want to be seen asap.   Was able to sched him for Thursday, 6/13/24 at 8:45 am, he accepted and we will see him then

## 2024-06-13 ENCOUNTER — OFFICE VISIT (OUTPATIENT)
Dept: PODIATRY | Facility: CLINIC | Age: 79
End: 2024-06-13
Payer: MEDICARE

## 2024-06-13 ENCOUNTER — HOSPITAL ENCOUNTER (OUTPATIENT)
Dept: RADIOLOGY | Facility: HOSPITAL | Age: 79
Discharge: HOME | End: 2024-06-13
Attending: STUDENT IN AN ORGANIZED HEALTH CARE EDUCATION/TRAINING PROGRAM
Payer: MEDICARE

## 2024-06-13 VITALS
DIASTOLIC BLOOD PRESSURE: 66 MMHG | HEART RATE: 59 BPM | BODY MASS INDEX: 29.46 KG/M2 | WEIGHT: 222.25 LBS | HEIGHT: 73 IN | SYSTOLIC BLOOD PRESSURE: 112 MMHG

## 2024-06-13 DIAGNOSIS — M79.673 HEEL PAIN, UNSPECIFIED LATERALITY: Primary | ICD-10-CM

## 2024-06-13 DIAGNOSIS — M79.673 HEEL PAIN, UNSPECIFIED LATERALITY: ICD-10-CM

## 2024-06-13 PROCEDURE — 73630 X-RAY EXAM OF FOOT: CPT | Mod: 26,LT,, | Performed by: RADIOLOGY

## 2024-06-13 PROCEDURE — 99214 OFFICE O/P EST MOD 30 MIN: CPT | Mod: PBBFAC,PN | Performed by: STUDENT IN AN ORGANIZED HEALTH CARE EDUCATION/TRAINING PROGRAM

## 2024-06-13 PROCEDURE — 99214 OFFICE O/P EST MOD 30 MIN: CPT | Mod: S$PBB,,, | Performed by: STUDENT IN AN ORGANIZED HEALTH CARE EDUCATION/TRAINING PROGRAM

## 2024-06-13 PROCEDURE — 73630 X-RAY EXAM OF FOOT: CPT | Mod: TC,FY,LT

## 2024-06-13 PROCEDURE — 99999 PR PBB SHADOW E&M-EST. PATIENT-LVL IV: CPT | Mod: PBBFAC,,, | Performed by: STUDENT IN AN ORGANIZED HEALTH CARE EDUCATION/TRAINING PROGRAM

## 2024-06-13 RX ORDER — METHYLPREDNISOLONE 4 MG/1
TABLET ORAL
Qty: 1 EACH | Refills: 0 | Status: SHIPPED | OUTPATIENT
Start: 2024-06-13 | End: 2024-07-04

## 2024-06-13 NOTE — PROGRESS NOTES
Subjective:     Patient ID: Janak Martin is a 78 y.o. male.    Chief Complaint:  Foot Pain (Left foot still in pain after injection)    Janak is a 78 y.o. male who presents to the clinic complaining of heel pain in the left foot, especially with the first step in the morning. The pain is described as Sharp. The onset of the pain was gradual and has worsened over the past several weeks. Janak rates the pain as 10/10. He denies a history of trauma. Prior treatments include corticosteroid injections in the past which helped. He is also complaining of thickened toenails.     6/13/24: Seen today for follow up ambulating in casual slip on sketchers shoes. Relates continued foot pain after injection. 8/10. No further pedal complaints.     Review of Systems   Constitutional: Negative for chills, decreased appetite, diaphoresis and fever.   HENT:  Negative for congestion and hearing loss.    Cardiovascular:  Negative for chest pain, claudication, leg swelling and syncope.   Respiratory:  Negative for cough and shortness of breath.    Skin:  Positive for dry skin and nail changes. Negative for color change, flushing, itching, poor wound healing and rash.   Musculoskeletal:  Negative for arthritis, back pain, joint pain and joint swelling.   Gastrointestinal:  Negative for nausea and vomiting.   Neurological:  Negative for focal weakness, numbness, paresthesias and weakness.   Psychiatric/Behavioral:  Negative for altered mental status. The patient is not nervous/anxious.         Objective:     Physical Exam  Constitutional:       General: He is not in acute distress.     Appearance: Normal appearance. He is well-developed. He is not diaphoretic.   Cardiovascular:      Comments: Dorsalis pedis and posterior tibial pulses are within normal limits. Skin temperature is within normal limits. Toes are cool to touch and feet are warm proximally. Hair growth is within normal limits. Skin is normotrophic and without  "hyperpigmentation. No edema noted. No varicosities or spider veins noted, bilaterally.       Musculoskeletal:         General: Tenderness present.      Comments: Adequate joint range of motion without pain, limitation, nor crepitation to foot and ankle joints. Muscle strength is 5/5 in all groups bilaterally.    Tenderness to left plantar heel at insertion of plantar fascia to medial tuberosity of the calcaneus       Feet:      Right foot:      Skin integrity: Dry skin present. No ulcer, blister, erythema or warmth.      Left foot:      Skin integrity: Dry skin present. No ulcer, blister, erythema or warmth.   Skin:     General: Skin is warm and dry.      Capillary Refill: Capillary refill takes less than 2 seconds.      Comments: Skin is warm and dry, no acute signs of infection noted bilaterally        Toenails are thickened by 2-4 mm's, dystrophic, and are darkened in coloration with subungual fungal debris.     Otherwise, no open wounds, macerations or hyperkeratotic lesions, bilaterally            Neurological:      Mental Status: He is alert and oriented to person, place, and time.      Motor: No abnormal muscle tone.      Comments: Light touch within normal limits.    Psychiatric:         Mood and Affect: Mood normal.         Behavior: Behavior normal.         Thought Content: Thought content normal.           Assessment:      Encounter Diagnosis   Name Primary?    Heel pain, unspecified laterality Yes       Plan:     Janak Pruitt" was seen today for foot pain.    Diagnoses and all orders for this visit:    Heel pain, unspecified laterality  -     X-Ray Foot Complete Left; Future  -     Uric acid; Future    Other orders  -     methylPREDNISolone (MEDROL DOSEPACK) 4 mg tablet; use as directed        I counseled the patient on his conditions, their implications and medical management.  Baseline xray ordered   RICE, NSAIDs PRN pain  Supportive tennis shoes with arch supports at all times while " ambulating  Stretching and strengthening exercises dispensed. Declines PT  Continue topical antifungal from [facility] for thickened toenails.   Return to clinic 6-8 weeks, sooner PRN

## 2024-06-13 NOTE — PATIENT INSTRUCTIONS
[employer] shoe [employer] on sever[address] by [facility].     Arch supports, consider SAS or new balance orthopedic shoes/velcro straps

## 2024-07-03 ENCOUNTER — OFFICE VISIT (OUTPATIENT)
Dept: PODIATRY | Facility: CLINIC | Age: 79
End: 2024-07-03
Payer: MEDICARE

## 2024-07-03 VITALS
HEIGHT: 73 IN | HEART RATE: 59 BPM | WEIGHT: 222.25 LBS | DIASTOLIC BLOOD PRESSURE: 71 MMHG | BODY MASS INDEX: 29.46 KG/M2 | SYSTOLIC BLOOD PRESSURE: 124 MMHG

## 2024-07-03 DIAGNOSIS — M72.2 PLANTAR FASCIITIS: Primary | ICD-10-CM

## 2024-07-03 PROCEDURE — 99999 PR PBB SHADOW E&M-EST. PATIENT-LVL III: CPT | Mod: PBBFAC,,, | Performed by: STUDENT IN AN ORGANIZED HEALTH CARE EDUCATION/TRAINING PROGRAM

## 2024-07-03 PROCEDURE — 99214 OFFICE O/P EST MOD 30 MIN: CPT | Mod: S$PBB,,, | Performed by: STUDENT IN AN ORGANIZED HEALTH CARE EDUCATION/TRAINING PROGRAM

## 2024-07-03 PROCEDURE — 99213 OFFICE O/P EST LOW 20 MIN: CPT | Mod: PBBFAC,PN | Performed by: STUDENT IN AN ORGANIZED HEALTH CARE EDUCATION/TRAINING PROGRAM

## 2024-07-03 NOTE — PROGRESS NOTES
Subjective:     Patient ID: Janak Martin is a 78 y.o. male.    Chief Complaint:  Heel Pain    Janak is a 78 y.o. male who presents to the clinic complaining of heel pain in the left foot, especially with the first step in the morning. The pain is described as Sharp. The onset of the pain was gradual and has worsened over the past several weeks. Janak rates the pain as 10/10. He denies a history of trauma. Prior treatments include corticosteroid injections in the past which helped. He is also complaining of thickened toenails.     6/13/24: Seen today for follow up ambulating in casual slip on [employer] shoes. Relates continued foot pain after injection. 8/10. No further pedal complaints.     7/3/24: Seen today, relates pain has mostly resolved with medrol dosepack. No new pedal complaints.     Review of Systems   Constitutional: Negative for chills, decreased appetite, diaphoresis and fever.   HENT:  Negative for congestion and hearing loss.    Cardiovascular:  Negative for chest pain, claudication, leg swelling and syncope.   Respiratory:  Negative for cough and shortness of breath.    Skin:  Positive for dry skin and nail changes. Negative for color change, flushing, itching, poor wound healing and rash.   Musculoskeletal:  Negative for arthritis, back pain, joint pain and joint swelling.   Gastrointestinal:  Negative for nausea and vomiting.   Neurological:  Negative for focal weakness, numbness, paresthesias and weakness.   Psychiatric/Behavioral:  Negative for altered mental status. The patient is not nervous/anxious.         Objective:     Physical Exam  Constitutional:       General: He is not in acute distress.     Appearance: Normal appearance. He is well-developed. He is not diaphoretic.   Cardiovascular:      Comments: Dorsalis pedis and posterior tibial pulses are within normal limits. Skin temperature is within normal limits. Toes are cool to touch and feet are warm proximally. Hair growth is within  "normal limits. Skin is normotrophic and without hyperpigmentation. No edema noted. No varicosities or spider veins noted, bilaterally.       Musculoskeletal:         General: Tenderness present.      Comments: Adequate joint range of motion without pain, limitation, nor crepitation to foot and ankle joints. Muscle strength is 5/5 in all groups bilaterally.    Tenderness to left plantar heel at insertion of plantar fascia to medial tuberosity of the calcaneus       Feet:      Right foot:      Skin integrity: Dry skin present. No ulcer, blister, erythema or warmth.      Left foot:      Skin integrity: Dry skin present. No ulcer, blister, erythema or warmth.   Skin:     General: Skin is warm and dry.      Capillary Refill: Capillary refill takes less than 2 seconds.      Comments: Skin is warm and dry, no acute signs of infection noted bilaterally        Toenails are thickened by 2-4 mm's, dystrophic, and are darkened in coloration with subungual fungal debris.     Otherwise, no open wounds, macerations or hyperkeratotic lesions, bilaterally            Neurological:      Mental Status: He is alert and oriented to person, place, and time.      Motor: No abnormal muscle tone.      Comments: Light touch within normal limits.    Psychiatric:         Mood and Affect: Mood normal.         Behavior: Behavior normal.         Thought Content: Thought content normal.           Assessment:      Encounter Diagnosis   Name Primary?    Plantar fasciitis Yes         Plan:     Janak "Misael" was seen today for heel pain.    Diagnoses and all orders for this visit:    Plantar fasciitis          I counseled the patient on his conditions, their implications and medical management.  Xray independently reviewed, no stress fractures noted, consider MRI if needed in the future.  RICE, NSAIDs PRN pain  Supportive tennis shoes with arch supports at all times while ambulating  Stretching and strengthening exercises previously dispensed. Declines " PT  Continue topical antifungal from [facility] for thickened toenails.   Return to clinic PRN

## 2024-07-23 ENCOUNTER — TELEPHONE (OUTPATIENT)
Dept: PODIATRY | Facility: CLINIC | Age: 79
End: 2024-07-23
Payer: MEDICARE

## 2024-07-23 NOTE — TELEPHONE ENCOUNTER
Called pt back and he ask me when he is able to get a next steroid injection. He just had a Medrol Dose pack , but he would like to have a next injection, please advice and I will schedule his next appointment for injection, thank you

## 2024-07-23 NOTE — TELEPHONE ENCOUNTER
Called pt back after Dr. Walsh send me a message and sched him for in 4 wk, send appt slip out to your house

## 2024-07-23 NOTE — TELEPHONE ENCOUNTER
----- Message from Carlos Bennett sent at 7/23/2024 12:38 PM CDT -----  Type:  Needs Medical Advice    Who Called: pt and spouse   Symptoms (please be specific): heel/spur    How long has patient had these symptoms:  3 months   Pharmacy name and phone #:    Would the patient rather a call back or a response via [facility]? call  Best Call Back Number: [phone]  Additional Information: pt would like a call from nurse in office regarding have a few questions to ask

## 2024-07-29 DIAGNOSIS — R39.15 URINARY URGENCY: ICD-10-CM

## 2024-07-29 DIAGNOSIS — R39.9 LOWER URINARY TRACT SYMPTOMS: ICD-10-CM

## 2024-07-30 RX ORDER — TAMSULOSIN HYDROCHLORIDE 0.4 MG/1
2 CAPSULE ORAL
Qty: 180 CAPSULE | Refills: 0 | Status: SHIPPED | OUTPATIENT
Start: 2024-07-30

## 2024-08-06 DIAGNOSIS — N32.81 OAB (OVERACTIVE BLADDER): ICD-10-CM

## 2024-08-06 RX ORDER — OXYBUTYNIN CHLORIDE 5 MG/1
5 TABLET, EXTENDED RELEASE ORAL
Qty: 90 TABLET | Refills: 0 | Status: SHIPPED | OUTPATIENT
Start: 2024-08-06

## 2024-08-19 DIAGNOSIS — N32.81 OAB (OVERACTIVE BLADDER): ICD-10-CM

## 2024-08-19 RX ORDER — OXYBUTYNIN CHLORIDE 5 MG/1
5 TABLET, EXTENDED RELEASE ORAL
Qty: 90 TABLET | Refills: 0 | Status: SHIPPED | OUTPATIENT
Start: 2024-08-19

## 2024-08-20 ENCOUNTER — OFFICE VISIT (OUTPATIENT)
Dept: PODIATRY | Facility: CLINIC | Age: 79
End: 2024-08-20
Payer: MEDICARE

## 2024-08-20 VITALS
HEART RATE: 59 BPM | SYSTOLIC BLOOD PRESSURE: 111 MMHG | HEIGHT: 73 IN | DIASTOLIC BLOOD PRESSURE: 65 MMHG | BODY MASS INDEX: 29.32 KG/M2

## 2024-08-20 DIAGNOSIS — M72.2 PLANTAR FASCIITIS: Primary | ICD-10-CM

## 2024-08-20 PROCEDURE — 99215 OFFICE O/P EST HI 40 MIN: CPT | Mod: PBBFAC,PN,25 | Performed by: STUDENT IN AN ORGANIZED HEALTH CARE EDUCATION/TRAINING PROGRAM

## 2024-08-20 PROCEDURE — 20550 NJX 1 TENDON SHEATH/LIGAMENT: CPT | Mod: PBBFAC,PN,LT | Performed by: STUDENT IN AN ORGANIZED HEALTH CARE EDUCATION/TRAINING PROGRAM

## 2024-08-20 PROCEDURE — 99999PBSHW PR PBB SHADOW TECHNICAL ONLY FILED TO HB: Mod: PBBFAC,,,

## 2024-08-20 PROCEDURE — 99999 PR PBB SHADOW E&M-EST. PATIENT-LVL V: CPT | Mod: PBBFAC,,, | Performed by: STUDENT IN AN ORGANIZED HEALTH CARE EDUCATION/TRAINING PROGRAM

## 2024-08-20 PROCEDURE — 99214 OFFICE O/P EST MOD 30 MIN: CPT | Mod: 25,S$PBB,, | Performed by: STUDENT IN AN ORGANIZED HEALTH CARE EDUCATION/TRAINING PROGRAM

## 2024-08-20 RX ORDER — METHYLPREDNISOLONE ACETATE 40 MG/ML
40 INJECTION, SUSPENSION INTRA-ARTICULAR; INTRALESIONAL; INTRAMUSCULAR; SOFT TISSUE ONCE
Status: COMPLETED | OUTPATIENT
Start: 2024-08-20 | End: 2024-08-20

## 2024-08-20 RX ADMIN — METHYLPREDNISOLONE ACETATE 40 MG: 40 INJECTION, SUSPENSION INTRA-ARTICULAR; INTRALESIONAL; INTRAMUSCULAR; SOFT TISSUE at 09:08

## 2024-08-20 NOTE — PROCEDURES
Tendon Sheath: L plantar fascia    Date/Time: 8/20/2024 9:15 AM    Performed by: Olivia Walsh DPM  Authorized by: lOivia Walsh DPM    Consent Done?:  Yes (Verbal)  Indications:  Pain  Timeout: prior to procedure the correct patient, procedure, and site was verified    Prep: patient was prepped and draped in usual sterile fashion      Location:  Foot  Site:  L plantar fascia  Needle size:  25 G  Approach:  Plantar  Medications:  40 mg methylPREDNISolone acetate (DEPO-MEDROL) 40  Patient tolerance:  Patient tolerated the procedure well with no immediate complications

## 2024-08-20 NOTE — PATIENT INSTRUCTIONS
SAS or [employer] Balance orthopedic shoes with arch supports found at [employer] on [address] by [facility].

## 2024-08-20 NOTE — PROGRESS NOTES
Subjective:     Patient ID: Janak Martin is a 78 y.o. male.    Chief Complaint:  Nail Problem, Nail Care, and Foot Problem (Left heel, is still painful,got better, but still painful)    Janak is a 78 y.o. male who presents to the clinic complaining of heel pain in the left foot, especially with the first step in the morning. The pain is described as Sharp. The onset of the pain was gradual and has worsened over the past several weeks. Janak rates the pain as 10/10. He denies a history of trauma. Prior treatments include corticosteroid injections in the past which helped. He is also complaining of thickened toenails.     6/13/24: Seen today for follow up ambulating in casual slip on sketchers shoes. Relates continued foot pain after injection. 8/10. No further pedal complaints.     7/3/24: Seen today, relates pain has mostly resolved with medrol dosepack. No new pedal complaints.     8/20/24: Seen today ambulating in casual slip on sketchers. States pain to left heel, 5/10. No further pedal complaints. States antifungal from [facility] is helping his toenails.     Review of Systems   Constitutional: Negative for chills, decreased appetite, diaphoresis and fever.   HENT:  Negative for congestion and hearing loss.    Cardiovascular:  Negative for chest pain, claudication, leg swelling and syncope.   Respiratory:  Negative for cough and shortness of breath.    Skin:  Positive for dry skin and nail changes. Negative for color change, flushing, itching, poor wound healing and rash.   Musculoskeletal:  Negative for arthritis, back pain, joint pain and joint swelling.   Gastrointestinal:  Negative for nausea and vomiting.   Neurological:  Negative for focal weakness, numbness, paresthesias and weakness.   Psychiatric/Behavioral:  Negative for altered mental status. The patient is not nervous/anxious.         Objective:     Physical Exam  Constitutional:       General: He is not in acute distress.     Appearance: Normal  "appearance. He is well-developed. He is not diaphoretic.   Cardiovascular:      Comments: Dorsalis pedis and posterior tibial pulses are within normal limits. Skin temperature is within normal limits. Toes are cool to touch and feet are warm proximally. Hair growth is within normal limits. Skin is normotrophic and without hyperpigmentation. No edema noted. No varicosities or spider veins noted, bilaterally.       Musculoskeletal:         General: Tenderness present.      Comments: Adequate joint range of motion without pain, limitation, nor crepitation to foot and ankle joints. Muscle strength is 5/5 in all groups bilaterally.    Tenderness to left plantar heel at insertion of plantar fascia to medial tuberosity of the calcaneus       Feet:      Right foot:      Skin integrity: Dry skin present. No ulcer, blister, erythema or warmth.      Left foot:      Skin integrity: Dry skin present. No ulcer, blister, erythema or warmth.   Skin:     General: Skin is warm and dry.      Capillary Refill: Capillary refill takes less than 2 seconds.      Comments: Skin is warm and dry, no acute signs of infection noted bilaterally        Toenails are thickened by 2-4 mm's, dystrophic, and are darkened in coloration with subungual fungal debris.     Otherwise, no open wounds, macerations or hyperkeratotic lesions, bilaterally            Neurological:      Mental Status: He is alert and oriented to person, place, and time.      Motor: No abnormal muscle tone.      Comments: Light touch within normal limits.    Psychiatric:         Mood and Affect: Mood normal.         Behavior: Behavior normal.         Thought Content: Thought content normal.           Assessment:      Encounter Diagnosis   Name Primary?    Plantar fasciitis Yes           Plan:     Janak Pruitt" was seen today for nail problem, nail care and foot problem.    Diagnoses and all orders for this visit:    Plantar fasciitis  -     Ambulatory referral/consult to " Physical/Occupational Therapy; Future  -     Tendon Sheath: L plantar fascia    Other orders  -     methylPREDNISolone acetate injection 40 mg            I counseled the patient on his conditions, their implications and medical management.  Xray independently reviewed, consider MRI if needed in the future.  Elects for corticosteroid injection to left heel. Discussed possible side effects from injection including but NOT limited to discoloration of skin, erosion of soft tissue, and increased likelihood of rupture of a soft tissue structure (ie. Plantar fascia, muscle, tendon, ligament, or capsule in the area of injection). Patient indicates understanding of my explanation, and patient gives verbal consent for injection of affected area. A time out was performed to verify the  correct  patient and site, prior to initiation of procedure.   RICE, NSAIDs PRN pain  Supportive tennis/orthopedic shoes with arch supports at all times while ambulating  Stretching and strengthening exercises previously dispensed. Rx PT  Continue topical antifungal from [facility] for thickened toenails.   Return to clinic PRN

## 2024-08-26 DIAGNOSIS — N32.81 OAB (OVERACTIVE BLADDER): ICD-10-CM

## 2024-08-26 RX ORDER — MIRABEGRON 25 MG/1
25 TABLET, FILM COATED, EXTENDED RELEASE ORAL
Qty: 90 TABLET | Refills: 0 | Status: SHIPPED | OUTPATIENT
Start: 2024-08-26

## 2024-10-08 ENCOUNTER — OFFICE VISIT (OUTPATIENT)
Dept: PODIATRY | Facility: CLINIC | Age: 79
End: 2024-10-08
Payer: MEDICARE

## 2024-10-08 VITALS
WEIGHT: 222.25 LBS | BODY MASS INDEX: 29.46 KG/M2 | DIASTOLIC BLOOD PRESSURE: 69 MMHG | HEIGHT: 73 IN | SYSTOLIC BLOOD PRESSURE: 117 MMHG | HEART RATE: 58 BPM

## 2024-10-08 DIAGNOSIS — B35.1 ONYCHOMYCOSIS DUE TO DERMATOPHYTE: ICD-10-CM

## 2024-10-08 DIAGNOSIS — M72.2 PLANTAR FASCIITIS: Primary | ICD-10-CM

## 2024-10-08 PROCEDURE — 99214 OFFICE O/P EST MOD 30 MIN: CPT | Mod: S$PBB,,, | Performed by: STUDENT IN AN ORGANIZED HEALTH CARE EDUCATION/TRAINING PROGRAM

## 2024-10-08 PROCEDURE — 99213 OFFICE O/P EST LOW 20 MIN: CPT | Mod: PBBFAC,PN | Performed by: STUDENT IN AN ORGANIZED HEALTH CARE EDUCATION/TRAINING PROGRAM

## 2024-10-08 PROCEDURE — 99999 PR PBB SHADOW E&M-EST. PATIENT-LVL III: CPT | Mod: PBBFAC,,, | Performed by: STUDENT IN AN ORGANIZED HEALTH CARE EDUCATION/TRAINING PROGRAM

## 2024-10-08 RX ORDER — METHYLPREDNISOLONE 4 MG/1
TABLET ORAL
Qty: 1 EACH | Refills: 0 | Status: SHIPPED | OUTPATIENT
Start: 2024-10-08 | End: 2024-10-29

## 2024-10-08 NOTE — PROGRESS NOTES
Subjective:     Patient ID: Janak Martin is a 79 y.o. male.    Chief Complaint:  Foot Pain (Left foot pain patient rate pain a 3. )    Janak is a 79 y.o. male who presents to the clinic complaining of heel pain in the left foot, especially with the first step in the morning. The pain is described as Sharp. The onset of the pain was gradual and has worsened over the past several weeks. Janak rates the pain as 10/10. He denies a history of trauma. Prior treatments include corticosteroid injections in the past which helped. He is also complaining of thickened toenails.     6/13/24: Seen today for follow up ambulating in casual slip on sketchers shoes. Melanie continued foot pain after injection. 8/10. No further pedal complaints.     7/3/24: Seen today, melanie pain has mostly resolved with medrol dosepack. No new pedal complaints.     8/20/24: Seen today ambulating in casual slip on sketchers. States pain to left heel, 5/10. No further pedal complaints. States antifungal from [facility] is helping his toenails.     10/8/24: Seen today, melanie has not started PT, has been doing exercises at home. Continues  slip on shoes. States pain is 2-3 at its worst. Continues with antifungal for toenails. States it is helping. He is having trouble trimming his toenails. No further pedal complaints.     Review of Systems   Constitutional: Negative for chills, decreased appetite, diaphoresis and fever.   HENT:  Negative for congestion and hearing loss.    Cardiovascular:  Negative for chest pain, claudication, leg swelling and syncope.   Respiratory:  Negative for cough and shortness of breath.    Skin:  Positive for dry skin and nail changes. Negative for color change, flushing, itching, poor wound healing and rash.   Musculoskeletal:  Negative for arthritis, back pain, joint pain and joint swelling.   Gastrointestinal:  Negative for nausea and vomiting.   Neurological:  Negative for focal weakness, numbness, paresthesias and  weakness.   Psychiatric/Behavioral:  Negative for altered mental status. The patient is not nervous/anxious.         Objective:     Physical Exam  Constitutional:       General: He is not in acute distress.     Appearance: Normal appearance. He is well-developed. He is not diaphoretic.   Cardiovascular:      Comments: Dorsalis pedis and posterior tibial pulses are within normal limits. Skin temperature is within normal limits. Toes are cool to touch and feet are warm proximally. Hair growth is within normal limits. Skin is normotrophic and without hyperpigmentation. No edema noted. No varicosities or spider veins noted, bilaterally.       Musculoskeletal:         General: Tenderness present.      Comments: Adequate joint range of motion without pain, limitation, nor crepitation to foot and ankle joints. Muscle strength is 5/5 in all groups bilaterally.    Tenderness to left plantar heel at insertion of plantar fascia to medial tuberosity of the calcaneus       Feet:      Right foot:      Skin integrity: Dry skin present. No ulcer, blister, erythema or warmth.      Left foot:      Skin integrity: Dry skin present. No ulcer, blister, erythema or warmth.   Skin:     General: Skin is warm and dry.      Capillary Refill: Capillary refill takes less than 2 seconds.      Comments: Skin is warm and dry, no acute signs of infection noted bilaterally        Toenails are thickened by 2-4 mm's, dystrophic, and are darkened in coloration with subungual fungal debris.     Otherwise, no open wounds, macerations or hyperkeratotic lesions, bilaterally            Neurological:      Mental Status: He is alert and oriented to person, place, and time.      Motor: No abnormal muscle tone.      Comments: Light touch within normal limits.    Psychiatric:         Mood and Affect: Mood normal.         Behavior: Behavior normal.         Thought Content: Thought content normal.           Assessment:      Encounter Diagnoses   Name Primary?     "Plantar fasciitis Yes    Onychomycosis due to dermatophyte              Plan:     Janak "Misael" was seen today for foot pain.    Diagnoses and all orders for this visit:    Plantar fasciitis    Onychomycosis due to dermatophyte    Other orders  -     methylPREDNISolone (MEDROL DOSEPACK) 4 mg tablet; use as directed              I counseled the patient on his conditions, their implications and medical management.  Declines corticosteroid injection. Requesting for medrol dosepack incase of flair up.   Continue RICE, NSAIDs PRN pain.  Supportive tennis/orthopedic shoes with arch supports at all times while ambulating  Stretching and strengthening exercises previously dispensed. Previously rx PT  Continue topical antifungal from [facility] for thickened toenails. Nails debrided 1-10 as a courtesy using sterile nail nippers without incident. Patient tolerated well.  Return to clinic PRN  Assisted by Deysi Baca LPN      "

## 2024-10-24 DIAGNOSIS — R39.15 URINARY URGENCY: ICD-10-CM

## 2024-10-24 DIAGNOSIS — R39.9 LOWER URINARY TRACT SYMPTOMS: ICD-10-CM

## 2024-10-24 RX ORDER — TAMSULOSIN HYDROCHLORIDE 0.4 MG/1
2 CAPSULE ORAL
Qty: 180 CAPSULE | Refills: 0 | Status: SHIPPED | OUTPATIENT
Start: 2024-10-24

## 2024-11-26 ENCOUNTER — TELEPHONE (OUTPATIENT)
Dept: UROLOGY | Facility: CLINIC | Age: 79
End: 2024-11-26
Payer: MEDICARE

## 2024-11-26 DIAGNOSIS — N32.81 OAB (OVERACTIVE BLADDER): ICD-10-CM

## 2024-11-26 RX ORDER — MIRABEGRON 50 MG/1
1 TABLET, FILM COATED, EXTENDED RELEASE ORAL DAILY
Qty: 30 TABLET | Refills: 11 | Status: SHIPPED | OUTPATIENT
Start: 2024-11-26 | End: 2025-11-26

## 2024-11-26 NOTE — TELEPHONE ENCOUNTER
----- Message from Renuka sent at 11/26/2024  9:06 AM CST -----  Who Called: Pt wife Roopa is calling on behalf of SASHA CAMARENA [[id number]]              New Prescription or Refill : Refill        RX Name and Strength:  mirabegron (MYRBETRIQ) 25 mg Tb24 ER tablet             30 day or 90 day RX: 90 day               Local or Mail Order : local               Preferred Pharmacy: [facility] [address]            Would the patient rather a call back or a response via [facility]? Call back         Best Call Back Number:   [phone]        Additional Information:

## 2024-12-09 ENCOUNTER — HOSPITAL ENCOUNTER (OUTPATIENT)
Dept: RADIOLOGY | Facility: HOSPITAL | Age: 79
Discharge: HOME | End: 2024-12-09
Attending: UROLOGY
Payer: MEDICARE

## 2024-12-09 DIAGNOSIS — N28.89 RENAL MASS, LEFT: ICD-10-CM

## 2024-12-09 DIAGNOSIS — R31.29 MICROHEMATURIA: ICD-10-CM

## 2024-12-09 PROCEDURE — 76775 US EXAM ABDO BACK WALL LIM: CPT | Mod: TC

## 2024-12-09 PROCEDURE — 76775 US EXAM ABDO BACK WALL LIM: CPT | Mod: 26,,, | Performed by: RADIOLOGY

## 2024-12-19 NOTE — PROGRESS NOTES
Subjective:      Janak Martin is a 79 y.o. male who returns today regarding his     No complatins  .    The following portions of the patient's history were reviewed and updated as appropriate: allergies, current medications, past family history, past medical history, past social history, past surgical history and problem list.    Review of Systems  Pertinent items are noted in HPI.  A comprehensive multipoint review of systems was negative except as otherwise stated in the HPI.    Past Medical History:   Diagnosis Date    Atrial fibrillation     Frequency of urination     Hypertension     Prostate cancer     Urinary frequency 05/02/2017     Past Surgical History:   Procedure Laterality Date    ANKLE SURGERY      Brachytherapy      CYSTOSCOPY      dorsal column stimulator      ELBOW SURGERY      EXTRACTION OF TOOTH  2022    3 times in the past year    HERNIA REPAIR      KNEE SURGERY         Review of patient's allergies indicates:  No Known Allergies       Objective:   Vitals: There were no vitals taken for this visit.    Physical Exam   General: alert and oriented, no acute distress  Respiratory: Symmetric expansion, non-labored breathing  Cardiovascular: no peripheral edema  Abdomen: non distended  Skin: normal coloration and turgor, no rashes, no suspicious skin lesions noted  Neuro: no gross deficits  Psych: normal judgment and insight, normal mood/affect, and non-anxious    Physical Exam    Lab Review   Urinalysis demonstrates negative for all components    Lab Results   Component Value Date    WBC 8.00 08/15/2022    HGB 17.1 08/15/2022    HCT 48.9 08/15/2022    MCV 90 08/15/2022     08/15/2022     Lab Results   Component Value Date    CREATININE 1.12 05/16/2024    BUN 23 (H) 05/16/2024     Lab Results   Component Value Date    PSADIAG <0.01 05/08/2024    PSADIAG <0.01 10/10/2023    PSADIAG <0.01 10/16/2022    PSATOTAL <0.01 10/16/2022    PSAFREE <0.10 10/16/2022    PSAFREEPCT Unable to calculate  10/16/2022         Imaging  US KIDNEY     CLINICAL HISTORY:  Other microscopic hematuria     TECHNIQUE:  Ultrasound of the kidneys was performed including color flow and Doppler evaluation of the kidneys.     COMPARISON:  05/30/2024 CT urogram 05/16/2024     FINDINGS:  Right kidney: The right kidney measures 12.2 cm.  No cortical thinning. No loss of corticomedullary distinction Resistive index measures 0.7.  5 cm right upper pole septated cyst, overall similar appearance compared to prior ultrasound 05/30/2024. No solid mass. No renal stone. No hydronephrosis.     Left kidney: The left kidney measures 13.1 cm. No cortical thinning. No loss of corticomedullary distinction Resistive index measures 0.69.  There is a 3.3 cm, mildly complex, cyst that is unchanged compared to prior ultrasound 05/30/2024.  Additionally, there is a 1.8 x 1.4 x 1.6 cm midpole echogenic focus.  This lesion appears stable when compared to previous CT and ultrasound, although definitive visualization cannot be confirmed in this study.  Nonobstructing nephrolithiasis.  No hydronephrosis.     Splenic resistive index: 0.70.     Impression:     1.8 cm left renal midpole solid lesion that appears similar when compared to prior study, cannot definitively rule out malignancy.  Consider follow-up with CT or MRI renal mass protocol for evaluation.     Bilateral mildly complex renal cysts, unchanged when compared to prior exam.     Nonobstructing left renal stones.     Left-sided nonobstructing nephrolithiasis.     Electronically signed by resident: Isael Benson  Date:                                            12/09/2024  Time:                                           13:26     Electronically signed by:Alhaji Cantor MD  Date:                                            12/09/2024  Time:                                           14:52            Assessment and Plan:   OAB (overactive bladder)  AUASS  UA    Left renal mass; possible hyperdense cyst;  stable     We discussed the natural history of small renal masses, the risk of malignancy and disease progression, and the small risk of mortality.  We discussed the risks and benefits of watchful waiting, biopsy, partial and total nephrectomy.  We discussed the benefits of renal preservation when possible.  We discussed percutaneous, laparoscopic and robotic approaches.  We discussed the management of positive surgical margins.  I answered all questions.     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

## 2024-12-20 ENCOUNTER — TELEPHONE (OUTPATIENT)
Dept: UROLOGY | Facility: CLINIC | Age: 79
End: 2024-12-20

## 2024-12-20 ENCOUNTER — OFFICE VISIT (OUTPATIENT)
Dept: UROLOGY | Facility: CLINIC | Age: 79
End: 2024-12-20
Payer: MEDICARE

## 2024-12-20 VITALS
HEART RATE: 66 BPM | RESPIRATION RATE: 18 BRPM | BODY MASS INDEX: 29.46 KG/M2 | HEIGHT: 73 IN | SYSTOLIC BLOOD PRESSURE: 94 MMHG | WEIGHT: 222.25 LBS | DIASTOLIC BLOOD PRESSURE: 55 MMHG

## 2024-12-20 DIAGNOSIS — C61 PROSTATE CANCER: ICD-10-CM

## 2024-12-20 DIAGNOSIS — N32.81 OAB (OVERACTIVE BLADDER): Primary | ICD-10-CM

## 2024-12-20 DIAGNOSIS — N28.1 COMPLEX RENAL CYST: ICD-10-CM

## 2024-12-20 LAB
BILIRUB SERPL-MCNC: NORMAL MG/DL
BLOOD URINE, POC: NORMAL
CLARITY, POC UA: CLEAR
COLOR, POC UA: NORMAL
GLUCOSE UR QL STRIP: NORMAL
KETONES UR QL STRIP: NORMAL
LEUKOCYTE ESTERASE URINE, POC: NORMAL
NITRITE, POC UA: NORMAL
PH, POC UA: 5
PROTEIN, POC: NORMAL
SPECIFIC GRAVITY, POC UA: 1.01
UROBILINOGEN, POC UA: NORMAL

## 2024-12-20 NOTE — TELEPHONE ENCOUNTER
----- Message from Odell Miranda MD sent at 12/20/2024 10:53 AM CST -----  PSA, BMP, CT renal protocol in 6 months then see Dr Miranda

## 2025-01-17 DIAGNOSIS — R39.15 URINARY URGENCY: ICD-10-CM

## 2025-01-17 DIAGNOSIS — R39.9 LOWER URINARY TRACT SYMPTOMS: ICD-10-CM

## 2025-01-21 RX ORDER — TAMSULOSIN HYDROCHLORIDE 0.4 MG/1
2 CAPSULE ORAL
Qty: 180 CAPSULE | Refills: 2 | Status: SHIPPED | OUTPATIENT
Start: 2025-01-21

## 2025-01-23 DIAGNOSIS — N32.81 OAB (OVERACTIVE BLADDER): ICD-10-CM

## 2025-01-23 RX ORDER — OXYBUTYNIN CHLORIDE 5 MG/1
5 TABLET, EXTENDED RELEASE ORAL
Qty: 90 TABLET | Refills: 0 | Status: SHIPPED | OUTPATIENT
Start: 2025-01-23

## 2025-03-07 ENCOUNTER — TELEPHONE (OUTPATIENT)
Dept: UROLOGY | Facility: CLINIC | Age: 80
End: 2025-03-07
Payer: MEDICARE

## 2025-03-07 NOTE — TELEPHONE ENCOUNTER
----- Message from Mone sent at 3/7/2025  9:50 AM CST -----  Regarding: call back  Type: Patient Call BackWho called: Roopa, spouse What is the request in detail:  requesting call to discuss mirabegron (MYRBETRIQ) 50 mg Tb24, says pt out of pocket is unaffordable; wants to see if auth can be submitted or alternative rx written for med. Pt is out of medication. Can the clinic reply by [facility]?noWould the patient rather a call back or a response via [facility]? callToodalu call back number: [phone] Additional Information:

## 2025-03-10 DIAGNOSIS — N32.81 OAB (OVERACTIVE BLADDER): Primary | ICD-10-CM

## 2025-03-10 RX ORDER — VIBEGRON 75 MG/1
75 TABLET, FILM COATED ORAL DAILY
Qty: 30 TABLET | Refills: 11 | Status: SHIPPED | OUTPATIENT
Start: 2025-03-10 | End: 2026-03-10

## 2025-04-08 DIAGNOSIS — N32.81 OAB (OVERACTIVE BLADDER): ICD-10-CM

## 2025-04-08 RX ORDER — MIRABEGRON 50 MG/1
1 TABLET, FILM COATED, EXTENDED RELEASE ORAL DAILY
Qty: 30 TABLET | Refills: 11 | Status: SHIPPED | OUTPATIENT
Start: 2025-04-08 | End: 2026-04-08

## 2025-04-18 DIAGNOSIS — N32.81 OAB (OVERACTIVE BLADDER): ICD-10-CM

## 2025-04-21 RX ORDER — OXYBUTYNIN CHLORIDE 5 MG/1
5 TABLET, EXTENDED RELEASE ORAL
Qty: 90 TABLET | Refills: 0 | Status: SHIPPED | OUTPATIENT
Start: 2025-04-21

## 2025-06-16 ENCOUNTER — RESULTS FOLLOW-UP (OUTPATIENT)
Dept: UROLOGY | Facility: CLINIC | Age: 80
End: 2025-06-16

## 2025-06-16 ENCOUNTER — HOSPITAL ENCOUNTER (OUTPATIENT)
Dept: RADIOLOGY | Facility: HOSPITAL | Age: 80
Discharge: HOME | End: 2025-06-16
Attending: UROLOGY
Payer: MEDICARE

## 2025-06-16 DIAGNOSIS — N28.1 COMPLEX RENAL CYST: ICD-10-CM

## 2025-06-16 PROCEDURE — 74178 CT ABD&PLV WO CNTR FLWD CNTR: CPT | Mod: TC

## 2025-06-16 PROCEDURE — 74178 CT ABD&PLV WO CNTR FLWD CNTR: CPT | Mod: 26,,, | Performed by: RADIOLOGY

## 2025-06-16 PROCEDURE — 25500020 PHARM REV CODE 255: Performed by: UROLOGY

## 2025-06-16 RX ADMIN — IOHEXOL 100 ML: 350 INJECTION, SOLUTION INTRAVENOUS at 08:06

## 2025-07-19 NOTE — PROGRESS NOTES
Subjective:      Janak Martin is a 79 y.o. male who returns today regarding his     Left renal mass  Enlarging      No hematuria  No flank pain    No symptoms of met disease    On eliquis for a [facility]  Cardiologist is Dr Odell Campos.    The following portions of the patient's history were reviewed and updated as appropriate: allergies, current medications, past family history, past medical history, past social history, past surgical history and problem list.    Review of Systems  Pertinent items are noted in HPI.  A comprehensive multipoint review of systems was negative except as otherwise stated in the HPI.    Past Medical History:   Diagnosis Date    Atrial fibrillation     Frequency of urination     Hypertension     Prostate cancer     Urinary frequency 05/02/2017     Past Surgical History:   Procedure Laterality Date    ANKLE SURGERY      Brachytherapy      CYSTOSCOPY      dorsal column stimulator      ELBOW SURGERY      EXTRACTION OF TOOTH  2022    3 times in the past year    HERNIA REPAIR      KNEE SURGERY         Review of patient's allergies indicates:  No Known Allergies       Objective:   Vitals: There were no vitals taken for this visit.    Physical Exam   General: alert and oriented, no acute distress  Respiratory: Symmetric expansion, non-labored breathing  Cardiovascular: no peripheral edema  Abdomen: non distended  Skin: normal coloration and turgor, no rashes, no suspicious skin lesions noted  Neuro: no gross deficits  Psych: normal judgment and insight, normal mood/affect, and non-anxious  NO LUQ SCARS  REDUCIBLE UH AND UPPER MIDLINE VENTRAL HERNIA  (HE HAS MESH IN THIS AREA)    Physical Exam    Lab Review   Urinalysis demonstrates NO SPECIMEN    Lab Results   Component Value Date    WBC 8.00 08/15/2022    HGB 17.1 08/15/2022    HCT 48.9 08/15/2022    MCV 90 08/15/2022     08/15/2022     Lab Results   Component Value Date    CREATININE 1.0 06/16/2025    BUN 21 06/16/2025     Lab Results    Component Value Date    PSA <0.01 06/16/2025    PSADIAG <0.01 05/08/2024    PSADIAG <0.01 10/10/2023    PSADIAG <0.01 10/16/2022    PSATOTAL <0.01 10/16/2022    PSAFREE <0.10 10/16/2022    PSAFREEPCT Unable to calculate 10/16/2022         Imaging  CT ABDOMEN PELVIS W WO CONTRAST     CLINICAL HISTORY:  1.8cm left mid/lower pole renal lesion ?nyperdense cyst;Cyst of kidney, acquired     TECHNIQUE:  Low dose axial images, sagittal and coronal reformations were obtained from the lung bases to the pubic symphysis before and following the IV administration of 100 mL of Omnipaque 350 .  Oral contrast was not administered..     COMPARISON:  05/16/2024     FINDINGS:  Abdomen:     - Lung bases: Chronic partially calcified scarring at the right lung base without change.     - Liver: No focal mass.     - Gallbladder: No calcified gallstones.     - Bile Ducts: No evidence of intra or extra hepatic biliary ductal dilation.     - Spleen: Negative.     - Kidneys:     Redemonstration of an enhancing mass in the midpole of the left kidney currently measuring 2.1 x 2.0 cm.  Previously measured 1.7 x 1.6 cm.     In addition there are bilateral renal cysts without significant change.     No renal calculi or hydroureteronephrosis bilaterally.     - Adrenals: Unremarkable.     - Pancreas: No mass or peripancreatic fat stranding.     - Retroperitoneum:  No suspicious lymph nodes.     - Vascular: There is a retroaortic left renal vein, a normal variant.     - Abdominal wall:  Fat containing periumbilical hernia.  Bilateral fat containing inguinal hernias.     Pelvis:     There are radiation seeds within the prostate.  No pelvic ascites.     Bowel/Mesentery:     No evidence of bowel obstruction or inflammation.     Bones:  No acute osseous abnormality and no suspicious lytic or blastic lesion. Spinal stimulator device in place.     Impression:     Enlarging enhancing left renal mass suspicious for a slowly growing neoplasm.  No evidence of  metastatic disease.     Incidentally noted retroaortic left renal vein, a normal variant.     Bilateral fat containing inguinal hernias and a fat containing periumbilical hernia noting evidence of prior hernia repair and mesh placement.     This report was flagged in Epic as abnormal.        Electronically signed by:Leland May  Date:                                            06/16/2025  Time:                                           10:09            CHEST PA AND LATERAL     CLINICAL HISTORY:  Personal history of other endocrine, nutritional and metabolic disease     COMPARISON:  Chest x-ray 08/15/2022.     FINDINGS:  Mildly enlarged cardiac silhouette.  Lungs clear.  No pleural fluid.  Thoracic neurostimulator present.     Impression:     No acute findings.        Electronically signed by:Chavez Cintron MD  Date:                                            11/04/2024  Time:                                           14:01    Assessment and Plan:   Prostate cancer GENET sp brachy approx 12 years  Psa 1 yr    OAB (overactive bladder)  AUASS  UA     Left renal mass; enlarging     We discussed the natural history of small renal masses, the risk of malignancy and disease progression, and the small risk of mortality.  We discussed the risks and benefits of watchful waiting, biopsy, partial and total nephrectomy.  We discussed the benefits of renal preservation when possible.  We discussed percutaneous, laparoscopic and robotic approaches.  We discussed the management of positive surgical margins.  I answered all questions.     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

## 2025-07-22 ENCOUNTER — OFFICE VISIT (OUTPATIENT)
Dept: UROLOGY | Facility: CLINIC | Age: 80
End: 2025-07-22
Payer: MEDICARE

## 2025-07-22 VITALS
HEIGHT: 73 IN | WEIGHT: 223.13 LBS | SYSTOLIC BLOOD PRESSURE: 105 MMHG | OXYGEN SATURATION: 93 % | HEART RATE: 64 BPM | DIASTOLIC BLOOD PRESSURE: 64 MMHG | TEMPERATURE: 98 F | BODY MASS INDEX: 29.57 KG/M2

## 2025-07-22 DIAGNOSIS — C61 PROSTATE CANCER: Primary | ICD-10-CM

## 2025-07-22 PROCEDURE — 99215 OFFICE O/P EST HI 40 MIN: CPT | Mod: S$GLB,,, | Performed by: UROLOGY

## 2025-07-22 RX ORDER — CEFAZOLIN SODIUM 2 G/50ML
2 SOLUTION INTRAVENOUS
OUTPATIENT
Start: 2025-07-22

## 2025-07-22 RX ORDER — HEPARIN SODIUM 5000 [USP'U]/ML
5000 INJECTION, SOLUTION INTRAVENOUS; SUBCUTANEOUS EVERY 8 HOURS
OUTPATIENT
Start: 2025-09-04

## 2025-07-22 NOTE — Clinical Note
Schedule surgery 9/4 Preop clearance with cardiology, Dr Levar Bone Preop visit with anesthesia and Dr Miranda thanks

## 2025-07-22 NOTE — LETTER
July 22, 2025        Levar Bone MD  [address] [place] [address]  [place] [address]             [facility] - Urology  [address], [address]  [facility] [address]  Phone: [phone]  Fax: [phone]   Patient: Janak Martin   MR Number: [id number]   YOB: 1945   Date of Visit: 7/22/2025       Dear Dr. Bone:    DR BONE,    MR MARITN IS SCHEDULED FOR ROBOTIC PARTIAL NEPHRECTOMY ON 9/4/2025.  CAN HE STOP HIS ELIQUIS?    Thank you for referring Janak Martin to me for evaluation. Attached you will find relevant portions of my assessment and plan of care.    If you have questions, please do not hesitate to call me. I look forward to following Janak Martin along with you.        Sincerely,      Odell Whyte MD    THANKS!    ODELL WHYTE  [phone] CELL            CC  No Recipients    Enclosure

## 2025-07-23 ENCOUNTER — TELEPHONE (OUTPATIENT)
Dept: UROLOGY | Facility: CLINIC | Age: 80
End: 2025-07-23
Payer: MEDICARE

## 2025-07-23 NOTE — TELEPHONE ENCOUNTER
----- Message from Odell Miranda MD sent at 7/22/2025  2:43 PM CDT -----  Schedule surgery 9/4  Preop clearance with cardiology, Dr Levar Bone  Preop visit with anesthesia and Dr Miranda  thanks

## 2025-07-23 NOTE — TELEPHONE ENCOUNTER
Letter sent to Dr. Bone's office. Preop appointment scheduled- instructed patient to reach out to Dr. Bone's office to see if he needed an appointment.    ----- Message from Odell Miranda MD sent at 7/22/2025  2:43 PM CDT -----  Schedule surgery 9/4  Preop clearance with cardiology, Dr Levar Bone  Preop visit with anesthesia and Dr Miranda  thanks

## 2025-07-23 NOTE — LETTER
July 23, 2025        Levar Bone MD  [address] [place] [address]  [place] [address]             [facility]  [address] [facility], [facility] [address]  [facility] [address]  Phone: [phone]  Fax: [phone]   Patient: Janak Martin   MR Number: [id number]   YOB: 1945   Date of Visit: 7/23/2025     Dear Dr. Bone,     Jnaak Martin is scheduled to have a partial nephrectomy with Dr. Odell Miranda on September 4th.  Please assist with surgical clearance. Patient is taking Eliquis. Are they cleared to hold this medication?  Please fax a response to [phone] Attn: Sharon. If you have any questions, please contact our office at [phone].     Sincerely,    Sharon Montgomery RN  [facility]

## 2025-07-28 ENCOUNTER — TELEPHONE (OUTPATIENT)
Dept: UROLOGY | Facility: CLINIC | Age: 80
End: 2025-07-28
Payer: MEDICARE

## 2025-07-28 NOTE — TELEPHONE ENCOUNTER
Called Marianela w/[facility]- they have not received the clearance letter, but have faxed their cardiac risk assessment form. Form faxed.    Copied from CRM #[id number]. Topic: General Inquiry - Patient Advice  >> Jul 28, 2025 10:05 AM Ita wrote:  Consult/Advisory     Name Of Caller: Marianela ([facility])      Contact Preference: [phone]     Nature of call: Need additional info on Cardiac Risk Assessment. Asking for a call back

## 2025-08-01 ENCOUNTER — TELEPHONE (OUTPATIENT)
Dept: UROLOGY | Facility: CLINIC | Age: 80
End: 2025-08-01
Payer: MEDICARE

## 2025-08-01 NOTE — TELEPHONE ENCOUNTER
Called [facility] to verify they had received clearance form-they have received it and are working on the request.

## 2025-08-04 DIAGNOSIS — N32.81 OAB (OVERACTIVE BLADDER): ICD-10-CM

## 2025-08-05 RX ORDER — OXYBUTYNIN CHLORIDE 5 MG/1
5 TABLET, EXTENDED RELEASE ORAL
Qty: 90 TABLET | Refills: 0 | Status: SHIPPED | OUTPATIENT
Start: 2025-08-05

## 2025-08-20 ENCOUNTER — TELEPHONE (OUTPATIENT)
Dept: UROLOGY | Facility: CLINIC | Age: 80
End: 2025-08-20
Payer: MEDICARE

## 2025-08-20 ENCOUNTER — ANESTHESIA EVENT (OUTPATIENT)
Dept: SURGERY | Facility: OTHER | Age: 80
End: 2025-08-20
Payer: MEDICARE

## 2025-08-21 ENCOUNTER — OFFICE VISIT (OUTPATIENT)
Dept: UROLOGY | Facility: CLINIC | Age: 80
End: 2025-08-21
Payer: MEDICARE

## 2025-08-21 ENCOUNTER — HOSPITAL ENCOUNTER (OUTPATIENT)
Dept: PREADMISSION TESTING | Facility: OTHER | Age: 80
Discharge: HOME | End: 2025-08-21
Attending: UROLOGY
Payer: MEDICARE

## 2025-08-21 VITALS
HEIGHT: 73 IN | WEIGHT: 215 LBS | TEMPERATURE: 98 F | BODY MASS INDEX: 28.49 KG/M2 | OXYGEN SATURATION: 96 % | SYSTOLIC BLOOD PRESSURE: 114 MMHG | HEART RATE: 64 BPM | RESPIRATION RATE: 16 BRPM | DIASTOLIC BLOOD PRESSURE: 57 MMHG

## 2025-08-21 VITALS — HEART RATE: 67 BPM | DIASTOLIC BLOOD PRESSURE: 68 MMHG | SYSTOLIC BLOOD PRESSURE: 115 MMHG

## 2025-08-21 DIAGNOSIS — C61 PROSTATE CANCER: ICD-10-CM

## 2025-08-21 DIAGNOSIS — C61 PROSTATE CANCER: Primary | ICD-10-CM

## 2025-08-21 DIAGNOSIS — Z01.818 PREOP TESTING: Primary | ICD-10-CM

## 2025-08-21 DIAGNOSIS — N28.89 LEFT RENAL MASS: ICD-10-CM

## 2025-08-21 LAB
INDIRECT COOMBS: NORMAL
RH BLD: NORMAL
SPECIMEN OUTDATE: NORMAL

## 2025-08-21 PROCEDURE — 86850 RBC ANTIBODY SCREEN: CPT | Performed by: UROLOGY

## 2025-08-21 PROCEDURE — 99215 OFFICE O/P EST HI 40 MIN: CPT | Mod: S$GLB,,, | Performed by: UROLOGY

## 2025-08-21 PROCEDURE — G2211 COMPLEX E/M VISIT ADD ON: HCPCS | Mod: S$GLB,,, | Performed by: UROLOGY

## 2025-08-21 RX ORDER — VIT A/VIT C/VIT E/ZINC/COPPER 2148-113
TABLET ORAL DAILY
COMMUNITY

## 2025-08-21 RX ORDER — SYRING-NEEDL,DISP,INSUL,0.3 ML 29 G X1/2"
74 SYRINGE, EMPTY DISPOSABLE MISCELLANEOUS ONCE
COMMUNITY

## 2025-08-21 RX ORDER — SODIUM CHLORIDE, SODIUM LACTATE, POTASSIUM CHLORIDE, CALCIUM CHLORIDE 600; 310; 30; 20 MG/100ML; MG/100ML; MG/100ML; MG/100ML
INJECTION, SOLUTION INTRAVENOUS CONTINUOUS
OUTPATIENT
Start: 2025-08-21

## 2025-08-21 RX ORDER — ACETAMINOPHEN 500 MG
500 TABLET ORAL EVERY 6 HOURS PRN
COMMUNITY

## 2025-08-21 RX ORDER — LIDOCAINE HYDROCHLORIDE 10 MG/ML
0.5 INJECTION, SOLUTION EPIDURAL; INFILTRATION; INTRACAUDAL; PERINEURAL ONCE
OUTPATIENT
Start: 2025-08-21 | End: 2025-08-21

## 2025-09-03 ENCOUNTER — TELEPHONE (OUTPATIENT)
Dept: UROLOGY | Facility: CLINIC | Age: 80
End: 2025-09-03
Payer: MEDICARE

## 2025-09-04 ENCOUNTER — ANESTHESIA (OUTPATIENT)
Dept: SURGERY | Facility: OTHER | Age: 80
End: 2025-09-04
Payer: MEDICARE

## 2025-09-04 PROBLEM — G47.33 OSA ON CPAP: Status: ACTIVE | Noted: 2025-09-04

## 2025-09-04 PROBLEM — I48.91 ATRIAL FIBRILLATION: Status: ACTIVE | Noted: 2022-08-15

## 2025-09-04 PROBLEM — I10 ESSENTIAL HYPERTENSION: Status: ACTIVE | Noted: 2022-04-22

## 2025-09-04 PROBLEM — N28.89 RENAL MASS: Status: ACTIVE | Noted: 2025-09-04

## 2025-09-04 PROCEDURE — 25000003 PHARM REV CODE 250: Performed by: NURSE ANESTHETIST, CERTIFIED REGISTERED

## 2025-09-04 PROCEDURE — 36620 INSERTION CATHETER ARTERY: CPT | Performed by: ANESTHESIOLOGY

## 2025-09-04 PROCEDURE — 63600175 PHARM REV CODE 636 W HCPCS: Performed by: UROLOGY

## 2025-09-04 PROCEDURE — 64486 TAP BLOCK UNIL BY INJECTION: CPT | Performed by: ANESTHESIOLOGY

## 2025-09-04 PROCEDURE — 63600175 PHARM REV CODE 636 W HCPCS

## 2025-09-04 PROCEDURE — 63600175 PHARM REV CODE 636 W HCPCS: Performed by: ANESTHESIOLOGY

## 2025-09-04 PROCEDURE — P9045 ALBUMIN (HUMAN), 5%, 250 ML: HCPCS | Mod: JZ,TB | Performed by: NURSE ANESTHETIST, CERTIFIED REGISTERED

## 2025-09-04 PROCEDURE — 63600175 PHARM REV CODE 636 W HCPCS: Performed by: NURSE ANESTHETIST, CERTIFIED REGISTERED

## 2025-09-04 RX ORDER — PROPOFOL 10 MG/ML
VIAL (ML) INTRAVENOUS
Status: DISCONTINUED | OUTPATIENT
Start: 2025-09-04 | End: 2025-09-04

## 2025-09-04 RX ORDER — SUCCINYLCHOLINE CHLORIDE 20 MG/ML
INJECTION INTRAMUSCULAR; INTRAVENOUS
Status: DISCONTINUED | OUTPATIENT
Start: 2025-09-04 | End: 2025-09-04

## 2025-09-04 RX ORDER — BUPIVACAINE HYDROCHLORIDE 2.5 MG/ML
INJECTION, SOLUTION EPIDURAL; INFILTRATION; INTRACAUDAL; PERINEURAL
Status: COMPLETED | OUTPATIENT
Start: 2025-09-04 | End: 2025-09-04

## 2025-09-04 RX ORDER — ALBUMIN HUMAN 50 G/1000ML
SOLUTION INTRAVENOUS
Status: DISCONTINUED | OUTPATIENT
Start: 2025-09-04 | End: 2025-09-04

## 2025-09-04 RX ORDER — FENTANYL CITRATE 50 UG/ML
INJECTION, SOLUTION INTRAMUSCULAR; INTRAVENOUS
Status: DISCONTINUED | OUTPATIENT
Start: 2025-09-04 | End: 2025-09-04

## 2025-09-04 RX ORDER — LIDOCAINE HYDROCHLORIDE 20 MG/ML
INJECTION INTRAVENOUS
Status: DISCONTINUED | OUTPATIENT
Start: 2025-09-04 | End: 2025-09-04

## 2025-09-04 RX ORDER — BUPIVACAINE 13.3 MG/ML
INJECTION, SUSPENSION, LIPOSOMAL INFILTRATION
Status: DISCONTINUED | OUTPATIENT
Start: 2025-09-04 | End: 2025-09-04

## 2025-09-04 RX ORDER — ONDANSETRON HYDROCHLORIDE 2 MG/ML
INJECTION, SOLUTION INTRAVENOUS
Status: DISCONTINUED | OUTPATIENT
Start: 2025-09-04 | End: 2025-09-04

## 2025-09-04 RX ORDER — ROCURONIUM BROMIDE 10 MG/ML
INJECTION, SOLUTION INTRAVENOUS
Status: DISCONTINUED | OUTPATIENT
Start: 2025-09-04 | End: 2025-09-04

## 2025-09-04 RX ORDER — DEXAMETHASONE SODIUM PHOSPHATE 4 MG/ML
INJECTION, SOLUTION INTRA-ARTICULAR; INTRALESIONAL; INTRAMUSCULAR; INTRAVENOUS; SOFT TISSUE
Status: DISCONTINUED | OUTPATIENT
Start: 2025-09-04 | End: 2025-09-04

## 2025-09-04 RX ORDER — PROPOFOL 10 MG/ML
VIAL (ML) INTRAVENOUS CONTINUOUS PRN
Status: DISCONTINUED | OUTPATIENT
Start: 2025-09-04 | End: 2025-09-04

## 2025-09-04 RX ORDER — KETAMINE HCL IN 0.9 % NACL 50 MG/5 ML
SYRINGE (ML) INTRAVENOUS
Status: DISCONTINUED | OUTPATIENT
Start: 2025-09-04 | End: 2025-09-04

## 2025-09-04 RX ORDER — PHENYLEPHRINE HYDROCHLORIDE 10 MG/ML
INJECTION INTRAVENOUS
Status: DISCONTINUED | OUTPATIENT
Start: 2025-09-04 | End: 2025-09-04

## 2025-09-04 RX ADMIN — ROCURONIUM BROMIDE 25 MG: 10 SOLUTION INTRAVENOUS at 09:09

## 2025-09-04 RX ADMIN — GLYCOPYRROLATE 0.2 MG: 0.2 INJECTION, SOLUTION INTRAMUSCULAR; INTRAVITREAL at 07:09

## 2025-09-04 RX ADMIN — ROCURONIUM BROMIDE 25 MG: 10 SOLUTION INTRAVENOUS at 07:09

## 2025-09-04 RX ADMIN — FENTANYL CITRATE 100 MCG: 50 INJECTION, SOLUTION INTRAMUSCULAR; INTRAVENOUS at 06:09

## 2025-09-04 RX ADMIN — ROCURONIUM BROMIDE 50 MG: 10 SOLUTION INTRAVENOUS at 07:09

## 2025-09-04 RX ADMIN — SODIUM CHLORIDE, POTASSIUM CHLORIDE, SODIUM LACTATE AND CALCIUM CHLORIDE: 600; 310; 30; 20 INJECTION, SOLUTION INTRAVENOUS at 06:09

## 2025-09-04 RX ADMIN — ALBUMIN (HUMAN) 250 ML: 12.5 SOLUTION INTRAVENOUS at 08:09

## 2025-09-04 RX ADMIN — SUCCINYLCHOLINE CHLORIDE 180 MG: 20 INJECTION, SOLUTION INTRAMUSCULAR; INTRAVENOUS at 07:09

## 2025-09-04 RX ADMIN — ONDANSETRON HYDROCHLORIDE 4 MG: 2 INJECTION INTRAMUSCULAR; INTRAVENOUS at 07:09

## 2025-09-04 RX ADMIN — PROPOFOL 50 MCG/KG/MIN: 10 INJECTION, EMULSION INTRAVENOUS at 07:09

## 2025-09-04 RX ADMIN — LIDOCAINE HYDROCHLORIDE 80 MG: 20 INJECTION, SOLUTION INTRAVENOUS at 07:09

## 2025-09-04 RX ADMIN — PHENYLEPHRINE HYDROCHLORIDE 200 MCG: 10 INJECTION INTRAVENOUS at 07:09

## 2025-09-04 RX ADMIN — ALBUMIN (HUMAN) 250 ML: 12.5 SOLUTION INTRAVENOUS at 09:09

## 2025-09-04 RX ADMIN — FENTANYL CITRATE 100 MCG: 50 INJECTION, SOLUTION INTRAMUSCULAR; INTRAVENOUS at 07:09

## 2025-09-04 RX ADMIN — SUGAMMADEX 200 MG: 100 INJECTION, SOLUTION INTRAVENOUS at 11:09

## 2025-09-04 RX ADMIN — CEFAZOLIN 2 G: 2 INJECTION, POWDER, FOR SOLUTION INTRAMUSCULAR; INTRAVENOUS at 07:09

## 2025-09-04 RX ADMIN — ROCURONIUM BROMIDE 25 MG: 10 SOLUTION INTRAVENOUS at 08:09

## 2025-09-04 RX ADMIN — Medication 30 MG: at 07:09

## 2025-09-04 RX ADMIN — PHENYLEPHRINE HYDROCHLORIDE 0.5 MCG/KG/MIN: 10 INJECTION INTRAVENOUS at 07:09

## 2025-09-04 RX ADMIN — SODIUM CHLORIDE, POTASSIUM CHLORIDE, SODIUM LACTATE AND CALCIUM CHLORIDE: 600; 310; 30; 20 INJECTION, SOLUTION INTRAVENOUS at 09:09

## 2025-09-04 RX ADMIN — BUPIVACAINE 20 ML: 13.3 INJECTION, SUSPENSION, LIPOSOMAL INFILTRATION at 06:09

## 2025-09-04 RX ADMIN — DEXAMETHASONE SODIUM PHOSPHATE 8 MG: 4 INJECTION, SOLUTION INTRAMUSCULAR; INTRAVENOUS at 07:09

## 2025-09-04 RX ADMIN — PROPOFOL 180 MG: 10 INJECTION, EMULSION INTRAVENOUS at 07:09

## 2025-09-04 RX ADMIN — BUPIVACAINE HYDROCHLORIDE 40 ML: 2.5 INJECTION, SOLUTION EPIDURAL; INFILTRATION; INTRACAUDAL; PERINEURAL at 06:09

## 2025-09-05 PROBLEM — R55 SYNCOPE: Status: ACTIVE | Noted: 2025-09-05

## 2025-09-05 PROBLEM — D72.829 LEUKOCYTOSIS: Status: ACTIVE | Noted: 2025-09-05

## 2025-09-06 PROBLEM — R17 ELEVATED BILIRUBIN: Status: ACTIVE | Noted: 2025-09-06

## 2025-09-06 PROBLEM — N17.9 AKI (ACUTE KIDNEY INJURY): Status: ACTIVE | Noted: 2025-09-06
